# Patient Record
Sex: MALE | Race: WHITE | NOT HISPANIC OR LATINO | Employment: OTHER | URBAN - METROPOLITAN AREA
[De-identification: names, ages, dates, MRNs, and addresses within clinical notes are randomized per-mention and may not be internally consistent; named-entity substitution may affect disease eponyms.]

---

## 2017-04-12 ENCOUNTER — CARE COORDINATION (OUTPATIENT)
Dept: CARDIOLOGY | Facility: CLINIC | Age: 62
End: 2017-04-12

## 2017-04-12 DIAGNOSIS — R00.2 PALPITATIONS: Primary | ICD-10-CM

## 2017-04-12 RX ORDER — CEFAZOLIN SODIUM 1 G/50ML
3 SOLUTION INTRAVENOUS
Status: CANCELLED | OUTPATIENT
Start: 2017-04-12

## 2017-06-23 ENCOUNTER — TELEPHONE (OUTPATIENT)
Dept: FAMILY MEDICINE | Facility: CLINIC | Age: 62
End: 2017-06-23

## 2017-06-23 DIAGNOSIS — I10 BENIGN ESSENTIAL HYPERTENSION: ICD-10-CM

## 2017-06-23 RX ORDER — METOPROLOL TARTRATE 50 MG
TABLET ORAL
Qty: 60 TABLET | Refills: 0 | Status: CANCELLED | OUTPATIENT
Start: 2017-06-23

## 2017-06-23 RX ORDER — AMLODIPINE BESYLATE 5 MG/1
TABLET ORAL
Qty: 30 TABLET | Refills: 0 | Status: CANCELLED | OUTPATIENT
Start: 2017-06-23

## 2017-06-23 NOTE — TELEPHONE ENCOUNTER
"Pt requesting three month supply, states \"she told me as long as my BP is good, I don't have to come in.\"  Norvasc      Last Written Prescription Date: 5/25/2017  Last Fill Quantity: 30, # refills: 0    Last Office Visit with [facility] or [facility] prescribing provider:  5/9/2016   Future Office Visit:        BP Readings from Last 3 Encounters:   06/06/16 128/87   05/23/16 (!) 142/97   05/09/16 149/87     Lopressor       Last Written Prescription Date: 5/25/2017  Last Fill Quantity: 60,  # refills: 0   Last Office Visit with [facility], [facility] prescribing provider: 5/9/2016                                               "

## 2017-06-26 ENCOUNTER — OFFICE VISIT (OUTPATIENT)
Dept: FAMILY MEDICINE | Facility: CLINIC | Age: 62
End: 2017-06-26

## 2017-06-26 VITALS
OXYGEN SATURATION: 96 % | WEIGHT: 312 LBS | DIASTOLIC BLOOD PRESSURE: 85 MMHG | BODY MASS INDEX: 43.52 KG/M2 | SYSTOLIC BLOOD PRESSURE: 125 MMHG | HEART RATE: 73 BPM

## 2017-06-26 DIAGNOSIS — E66.01 MORBID OBESITY DUE TO EXCESS CALORIES (H): ICD-10-CM

## 2017-06-26 DIAGNOSIS — N40.1 BENIGN NON-NODULAR PROSTATIC HYPERPLASIA WITH LOWER URINARY TRACT SYMPTOMS: ICD-10-CM

## 2017-06-26 DIAGNOSIS — R73.03 PREDIABETES: Primary | ICD-10-CM

## 2017-06-26 DIAGNOSIS — Z11.59 NEED FOR HEPATITIS C SCREENING TEST: ICD-10-CM

## 2017-06-26 DIAGNOSIS — I10 BENIGN ESSENTIAL HYPERTENSION: ICD-10-CM

## 2017-06-26 LAB
ANION GAP SERPL CALCULATED.3IONS-SCNC: 4 MMOL/L (ref 3–14)
BUN SERPL-MCNC: 20 MG/DL (ref 7–30)
CALCIUM SERPL-MCNC: 9.7 MG/DL (ref 8.5–10.1)
CHLORIDE SERPL-SCNC: 104 MMOL/L (ref 94–109)
CO2 SERPL-SCNC: 30 MMOL/L (ref 20–32)
CREAT SERPL-MCNC: 0.95 MG/DL (ref 0.66–1.25)
GFR SERPL CREATININE-BSD FRML MDRD: 80 ML/MIN/1.7M2
GLUCOSE SERPL-MCNC: 120 MG/DL (ref 70–99)
HBA1C MFR BLD: 6 % (ref 4.3–6)
POTASSIUM SERPL-SCNC: 4.4 MMOL/L (ref 3.4–5.3)
SODIUM SERPL-SCNC: 138 MMOL/L (ref 133–144)

## 2017-06-26 PROCEDURE — 99214 OFFICE O/P EST MOD 30 MIN: CPT | Performed by: NURSE PRACTITIONER

## 2017-06-26 PROCEDURE — 36415 COLL VENOUS BLD VENIPUNCTURE: CPT | Performed by: NURSE PRACTITIONER

## 2017-06-26 PROCEDURE — 80048 BASIC METABOLIC PNL TOTAL CA: CPT | Performed by: NURSE PRACTITIONER

## 2017-06-26 PROCEDURE — 83036 HEMOGLOBIN GLYCOSYLATED A1C: CPT | Performed by: NURSE PRACTITIONER

## 2017-06-26 RX ORDER — AMLODIPINE BESYLATE 5 MG/1
5 TABLET ORAL DAILY
Qty: 90 TABLET | Refills: 3 | Status: SHIPPED | OUTPATIENT
Start: 2017-06-26 | End: 2018-06-18

## 2017-06-26 RX ORDER — METOPROLOL TARTRATE 50 MG
TABLET ORAL
Qty: 180 TABLET | Refills: 3 | Status: SHIPPED | OUTPATIENT
Start: 2017-06-26 | End: 2018-07-12

## 2017-06-26 RX ORDER — TAMSULOSIN HYDROCHLORIDE 0.4 MG/1
0.4 CAPSULE ORAL DAILY
Qty: 90 CAPSULE | Refills: 3 | Status: SHIPPED | OUTPATIENT
Start: 2017-06-26

## 2017-06-26 NOTE — MR AVS SNAPSHOT
After Visit Summary   6/26/2017    Umair Castellanos    MRN: [id number]           Patient Information     Date Of Birth          1955        Visit Information        Provider Department      6/26/2017 1:40 PM Lindsay Chacko APRN CNP [facility]        Today's Diagnoses     Prediabetes    -  1    Benign essential hypertension        Benign non-nodular prostatic hyperplasia with lower urinary tract symptoms        Need for hepatitis C screening test          Care Instructions    1. Labs today            Thank you for choosing [facility].  You may be receiving a survey in the mail from Antoni Gamble regarding your visit today.  Please take a few minutes to complete and return the survey to let us know how we are doing.      If you have questions or concerns, please contact us via Front[employer] or you can contact your care team at [phone].    Our Clinic hours are:  Monday 6:40 am  to 7:00 pm  Tuesday -Friday 6:40 am to 5:00 pm    The [place] outpatient lab hours are:  Monday - Friday 6:10 am to 4:45 pm  Saturdays 7:00 am to 11:00 am  Appointments are required, call [phone]    If you have clinical questions after hours or would like to schedule an appointment,  call the clinic at [phone].          Follow-ups after your visit        Who to contact     If you have questions or need follow up information about today's clinic visit or your schedule please contact [facility] directly at [phone].  Normal or non-critical lab and imaging results will be communicated to you by [employer], letter or phone within 4 business days after the clinic has received the results. If you do not hear from us within 7 days, please contact the clinic through [employer] or phone. If you have a critical or abnormal lab result, we will notify you by phone as soon as possible.  Submit refill requests through [employer] or call your pharmacy and they will forward the refill request to us.  "Please allow 3 business days for your refill to be completed.          Additional Information About Your Visit        MyChart Information     [employer] lets you send messages to your doctor, view your test results, renew your prescriptions, schedule appointments and more. To sign up, go to [email or web address] . Click on \"Log in\" on the left side of the screen, which will take you to the Welcome page. Then click on \"Sign up Now\" on the right side of the page.     You will be asked to enter the access code listed below, as well as some personal information. Please follow the directions to create your username and password.     Your access code is: [email or web address]  Expires: 2017  2:05 PM     Your access code will  in 90 days. If you need help or a new code, please call your [place] clinic or [phone].        Care EveryWhere ID     This is your Care EveryWhere ID. This could be used by other organizations to access your [place] medical records  [id number]        Your Vitals Were     Pulse Pulse Oximetry BMI (Body Mass Index)             73 96% 43.52 kg/m2          Blood Pressure from Last 3 Encounters:   17 125/85   16 128/87   16 (!) 142/97    Weight from Last 3 Encounters:   17 (!) 312 lb (141.5 kg)   16 (!) 314 lb (142.4 kg)   14 292 lb (132.5 kg)              We Performed the Following     Basic metabolic panel     Hemoglobin A1c          Today's Medication Changes          These changes are accurate as of: 17  2:05 PM.  If you have any questions, ask your nurse or doctor.               These medicines have changed or have updated prescriptions.        Dose/Directions    amLODIPine 5 MG tablet   Commonly known as:  NORVASC   This may have changed:  See the new instructions.   Used for:  Benign essential hypertension        Dose:  5 mg   Take 1 tablet (5 mg) by mouth daily   Quantity:  90 tablet   Refills:  3       metoprolol 50 MG tablet   Commonly known as: "  LOPRESSOR   This may have changed:  See the new instructions.   Used for:  Benign essential hypertension        TAKE 1 TABLET (50 MG) BY MOUTH 2 TIMES DAILY   Quantity:  180 tablet   Refills:  3            Where to get your medicines      These medications were sent to [place] [address] [facility] - [address], [facility] [address]     Phone:  [phone]     amLODIPine 5 MG tablet    metoprolol 50 MG tablet    tamsulosin 0.4 MG capsule                Primary Care Provider Office Phone # Fax #    QASIM Shaver [facility] [phone]       [facility] [address] [facility] [address]        Equal Access to Services     RICARDA DAVID : Hadii aad ku hadasho Soomaali, waaxda luqadaha, qaybta kaalmada adeegyada, aleta norton. So [facility] [phone].    ATENCIÓN: Si habla español, tiene a cabrera disposición servicios gratuitos de asistencia lingüística. Llame al [phone].    We comply with applicable federal civil rights laws and [place] laws. We do not discriminate on the basis of race, color, national origin, age, disability sex, sexual orientation or gender identity.            Thank you!     Thank you for choosing [facility]  for your care. Our goal is always to provide you with excellent care. Hearing back from our patients is one way we can continue to improve our services. Please take a few minutes to complete the written survey that you may receive in the mail after your visit with us. Thank you!             Your Updated Medication List - Protect others around you: Learn how to safely use, store and throw away your medicines at www.disposemymeds.org.          This list is accurate as of: 6/26/17  2:05 PM.  Always use your most recent med list.                   Brand Name Dispense Instructions for use Diagnosis    amLODIPine 5 MG tablet    NORVASC    90 tablet    Take 1 tablet (5 mg) by mouth daily    Benign  essential hypertension       aspirin 81 MG tablet      Take 1 tablet by mouth daily.        metoprolol 50 MG tablet    LOPRESSOR    180 tablet    TAKE 1 TABLET (50 MG) BY MOUTH 2 TIMES DAILY    Benign essential hypertension       tamsulosin 0.4 MG capsule    FLOMAX    90 capsule    Take 1 capsule (0.4 mg) by mouth daily    Benign non-nodular prostatic hyperplasia with lower urinary tract symptoms

## 2017-06-26 NOTE — PATIENT INSTRUCTIONS
1. Labs today            Thank you for choosing [facility].  You may be receiving a survey in the mail from Antoni Gamble regarding your visit today.  Please take a few minutes to complete and return the survey to let us know how we are doing.      If you have questions or concerns, please contact us via [employer] or you can contact your care team at [phone].    Our Clinic hours are:  Monday 6:40 am  to 7:00 pm  Tuesday -Friday 6:40 am to 5:00 pm    The [place] outpatient lab hours are:  Monday - Friday 6:10 am to 4:45 pm  Saturdays 7:00 am to 11:00 am  Appointments are required, call [phone]    If you have clinical questions after hours or would like to schedule an appointment,  call the clinic at [phone].

## 2017-06-26 NOTE — PROGRESS NOTES
Please let the patient know that all labs are normal.  Glucose slightly high- patient may not have been fasting

## 2017-06-26 NOTE — NURSING NOTE
"Initial /85 (BP Location: Left arm, Patient Position: Left side, Cuff Size: Adult Large)  Pulse 73  Wt (!) 312 lb (141.5 kg)  SpO2 96%  BMI 43.52 kg/m2 Estimated body mass index is 43.52 kg/(m^2) as calculated from the following:    Height as of 5/9/16: 5' 11\" (1.803 m).    Weight as of this encounter: 312 lb (141.5 kg). .    Fide Hawkins    "

## 2017-06-26 NOTE — PROGRESS NOTES
"  SUBJECTIVE:                                                    Umair Castellanos is a 62 year old male who presents to clinic today for the following health issues:      Hypertension Follow-up      Outpatient blood pressures are being checked at home.  Results are \"normal\".    Low Salt Diet: low salt      Amount of exercise or physical activity: None    Problems taking medications regularly: No    Medication side effects: none    Diet: regular (no restrictions)  BP Readings from Last 6 Encounters:   06/26/17 125/85   06/06/16 128/87   05/23/16 (!) 142/97   05/09/16 149/87   12/05/14 (!) 168/102   06/02/14 131/75       Prediabetes: active being retired otherwise does not work out . Has not lost weight. Does not monitor his diet.     Morbid obesity:  being retired otherwise does not work out . Has not lost weight. Does not monitor his diet.       Medication Followup of Flomax    Taking Medication as prescribed: yes    Side Effects:  None    Medication Helping Symptoms:  yes       Problem list and histories reviewed & adjusted, as indicated.  Additional history: as documented    Patient Active Problem List   Diagnosis     Benign essential hypertension     Hyperlipidemia LDL goal <100     Obesity     Syncope     Colon polyp     Ascending aorta dilatation (H)     Sinusitis, chronic     Deviated nasal septum     Past Surgical History:   Procedure Laterality Date     ARTHROPLASTY KNEE       COLONOSCOPY  7/28/2011    Procedure:COLONOSCOPY; Surgeon:VERONIQUE CHIN; Location:[place] [facility]     COLONOSCOPY,LULU HINOJOSA/CAUTERY       LARYNGOSCOPY, EXCISE VOCAL CORD LESION COMPLEX, COMBINED         Social History   Substance Use Topics     Smoking status: Never Smoker     Smokeless tobacco: Never Used     Alcohol use 0.5 oz/week     1 Cans of beer per week      Comment: ONE OR TWO WEEKLY     Family History   Problem Relation Age of Onset     DIABETES Father      Hearing Loss Father      High cholesterol Father      Eye Disorder " Father      Hypertension Father      Arthritis Sister      Neurologic Disorder Sister      migraines     HEART DISEASE Brother 64     AORTIC VALVE REPLACEMENT     HEART DISEASE Maternal Grandmother      HEART DISEASE Maternal Grandfather      HEART DISEASE Paternal Grandmother      KIDNEY DISEASE Paternal Grandmother      Hypertension Mother      CEREBROVASCULAR DISEASE Mother      CANCER Maternal Uncle      HEART DISEASE Paternal Aunt            Reviewed and updated as needed this visit by clinical staff       Reviewed and updated as needed this visit by Provider         ROS:  Constitutional, HEENT, cardiovascular, pulmonary, GI, , musculoskeletal, neuro, skin, endocrine and psych systems are negative, except as otherwise noted.    OBJECTIVE:     /85 (BP Location: Left arm, Patient Position: Left side, Cuff Size: Adult Large)  Pulse 73  Wt (!) 312 lb (141.5 kg)  SpO2 96%  BMI 43.52 kg/m2  Body mass index is 43.52 kg/(m^2).  GENERAL: alert, no distress and obese  RESP: lungs clear to auscultation - no rales, rhonchi or wheezes  CV: regular rate and rhythm, normal S1 S2, no S3 or S4, no murmur, click or rub, no peripheral edema and peripheral pulses strong  MS: no gross musculoskeletal defects noted, no edema    Diagnostic Test Results:  none     ASSESSMENT/PLAN:         1. Benign essential hypertension  controlled  - amLODIPine (NORVASC) 5 MG tablet; Take 1 tablet (5 mg) by mouth daily  Dispense: 90 tablet; Refill: 3  - metoprolol (LOPRESSOR) 50 MG tablet; TAKE 1 TABLET (50 MG) BY MOUTH 2 TIMES DAILY  Dispense: 180 tablet; Refill: 3    2. Benign non-nodular prostatic hyperplasia with lower urinary tract symptoms  stable  - tamsulosin (FLOMAX) 0.4 MG capsule; Take 1 capsule (0.4 mg) by mouth daily  Dispense: 90 capsule; Refill: 3  - Basic metabolic panel    3. Prediabetes  Discussed importance of weight loss, healthy eating and start exercising   - Hemoglobin A1c    4. Need for hepatitis C screening  test  Patient declined at this time as he does not have insurance benefits     5. Morbid obesity due to excess calories (H)  Encouraged to start exercise and healthier eating plan.           QASIM Shaver [facility]

## 2017-06-26 NOTE — TELEPHONE ENCOUNTER
Left message for patient to return call to clinic.  Needs appointment per last refill note from 5/19/17 from provider.  It has been over a year since last OV.    Arleen Varela RN

## 2018-06-18 DIAGNOSIS — I10 BENIGN ESSENTIAL HYPERTENSION: ICD-10-CM

## 2018-06-18 NOTE — TELEPHONE ENCOUNTER
"Requested Prescriptions   Pending Prescriptions Disp Refills     amLODIPine (NORVASC) 5 MG tablet [Pharmacy Med Name: AMLODIPINE BESYLATE 5 MG TAB]  Last Written Prescription Date:  06/26/17  Last Fill Quantity: 90,  # refills: 3   Last office visit: 6/26/2017 with prescribing provider:  06/26/17   Future Office Visit:     90 tablet 3     Sig: TAKE 1 TABLET (5 MG) BY MOUTH DAILY    Calcium Channel Blockers Protocol  Passed    6/18/2018  1:47 AM       Passed - Blood pressure under 140/90 in past 12 months    BP Readings from Last 3 Encounters:   06/26/17 125/85   06/06/16 128/87   05/23/16 (!) 142/97          Passed - Recent (12 mo) or future (30 days) visit within the authorizing provider's specialty    Patient had office visit in the last 12 months or has a visit in the next 30 days with authorizing provider or within the authorizing provider's specialty.  See \"Patient Info\" tab in inbasket, or \"Choose Columns\" in Meds & Orders section of the refill encounter.           Passed - Patient is age 18 or older       Passed - Normal serum creatinine on file in past 12 months    Recent Labs   Lab Test  06/26/17   1409   CR  0.95               "

## 2018-06-20 RX ORDER — AMLODIPINE BESYLATE 5 MG/1
5 TABLET ORAL DAILY
Qty: 30 TABLET | Refills: 0 | Status: SHIPPED | OUTPATIENT
Start: 2018-06-20

## 2018-07-12 DIAGNOSIS — I10 BENIGN ESSENTIAL HYPERTENSION: ICD-10-CM

## 2018-07-12 NOTE — TELEPHONE ENCOUNTER
"Requested Prescriptions   Pending Prescriptions Disp Refills     metoprolol tartrate (LOPRESSOR) 50 MG tablet [Pharmacy Med Name: METOPROLOL TARTRATE 50 MG TAB]  Last Written Prescription Date:  06/26/17  Last Fill Quantity: 180,  # refills: 3   Last office visit: 6/26/2017 with prescribing provider:  06/26/17   Future Office Visit:     180 tablet 1     Sig: TAKE 1 TABLET BY MOUTH TWICE DAILY    Beta-Blockers Protocol Failed    7/12/2018  9:25 AM       Failed - Blood pressure under 140/90 in past 12 months    BP Readings from Last 3 Encounters:   06/26/17 125/85   06/06/16 128/87   05/23/16 (!) 142/97          Failed - Recent (12 mo) or future (30 days) visit within the authorizing provider's specialty    Patient had office visit in the last 12 months or has a visit in the next 30 days with authorizing provider or within the authorizing provider's specialty.  See \"Patient Info\" tab in inbasket, or \"Choose Columns\" in Meds & Orders section of the refill encounter.           Passed - Patient is age 6 or older          "

## 2018-07-13 RX ORDER — METOPROLOL TARTRATE 50 MG
50 TABLET ORAL 2 TIMES DAILY
Qty: 60 TABLET | Refills: 0 | Status: SHIPPED | OUTPATIENT
Start: 2018-07-13

## 2018-07-22 DIAGNOSIS — I10 BENIGN ESSENTIAL HYPERTENSION: ICD-10-CM

## 2018-07-23 NOTE — TELEPHONE ENCOUNTER
"Requested Prescriptions   Pending Prescriptions Disp Refills     amLODIPine (NORVASC) 5 MG tablet [Pharmacy Med Name: AMLODIPINE BESYLATE 5 MG TAB]  Last Written Prescription Date:  06/2/18  Last Fill Quantity: 30,  # refills: 0   Last office visit: 6/26/2017 with prescribing provider:  06/26/17   Future Office Visit:     30 tablet 0     Sig: TAKE 1 TABLET (5 MG) BY MOUTH DAILY (NEEDS FOLLOW-UP APPOINTMENT FOR THIS MEDICATION)    Calcium Channel Blockers Protocol  Failed    7/22/2018  1:52 AM       Failed - Blood pressure under 140/90 in past 12 months    BP Readings from Last 3 Encounters:   06/26/17 125/85   06/06/16 128/87   05/23/16 (!) 142/97          Failed - Recent (12 mo) or future (30 days) visit within the authorizing provider's specialty    Patient had office visit in the last 12 months or has a visit in the next 30 days with authorizing provider or within the authorizing provider's specialty.  See \"Patient Info\" tab in inbasket, or \"Choose Columns\" in Meds & Orders section of the refill encounter.         Failed - Normal serum creatinine on file in past 12 months    Recent Labs   Lab Test  06/26/17   1409   CR  0.95          Passed - Patient is age 18 or older          "

## 2018-07-24 NOTE — TELEPHONE ENCOUNTER
Left message on answering machine for patient to call back.  Alba was given 6/20/18 and no appt scheduled.     Patsy VEGA

## 2018-07-25 RX ORDER — AMLODIPINE BESYLATE 5 MG/1
TABLET ORAL
Qty: 30 TABLET | Refills: 0 | OUTPATIENT
Start: 2018-07-25

## 2018-08-15 DIAGNOSIS — I10 BENIGN ESSENTIAL HYPERTENSION: ICD-10-CM

## 2018-08-15 RX ORDER — METOPROLOL TARTRATE 50 MG
TABLET ORAL
Qty: 60 TABLET | Refills: 0 | OUTPATIENT
Start: 2018-08-15

## 2018-08-15 NOTE — TELEPHONE ENCOUNTER
"Requested Prescriptions   Pending Prescriptions Disp Refills     metoprolol tartrate (LOPRESSOR) 50 MG tablet [Pharmacy Med Name: METOPROLOL TARTRATE 50 MG TAB]  Last Written Prescription Date:  07/13/18  Last Fill Quantity: 60,  # refills: 0   Last office visit: 6/26/2017 with prescribing provider:  06/26/17   Future Office Visit:     60 tablet 0     Sig: TAKE 1 TABLET (50 MG) BY MOUTH 2 TIMES DAILY (NEEDS FOLLOW-UP APPOINTMENT FOR THIS MEDICATION)    Beta-Blockers Protocol Failed    8/15/2018  1:59 AM       Failed - Blood pressure under 140/90 in past 12 months    BP Readings from Last 3 Encounters:   06/26/17 125/85   06/06/16 128/87   05/23/16 (!) 142/97          Failed - Recent (12 mo) or future (30 days) visit within the authorizing provider's specialty    Patient had office visit in the last 12 months or has a visit in the next 30 days with authorizing provider or within the authorizing provider's specialty.  See \"Patient Info\" tab in inbasket, or \"Choose Columns\" in Meds & Orders section of the refill encounter.           Passed - Patient is age 6 or older          "

## 2024-07-26 ENCOUNTER — TRANSFERRED RECORDS (OUTPATIENT)
Dept: HEALTH INFORMATION MANAGEMENT | Facility: CLINIC | Age: 69
End: 2024-07-26

## 2025-05-20 ENCOUNTER — TRANSFERRED RECORDS (OUTPATIENT)
Dept: HEALTH INFORMATION MANAGEMENT | Facility: CLINIC | Age: 70
End: 2025-05-20
Payer: MEDICARE

## 2025-05-27 ENCOUNTER — TELEPHONE (OUTPATIENT)
Dept: OPHTHALMOLOGY | Facility: CLINIC | Age: 70
End: 2025-05-27
Payer: MEDICARE

## 2025-05-27 NOTE — TELEPHONE ENCOUNTER
[facility] Call Center    Phone Message    May a detailed message be left on voicemail: yes     Reason for Call: Other: Patient's spouse, Antoinette, returned call, stating that is would be easier to call her phone, [phone], as the patient's phone is difficult to get into in time to answer or access messages. Thank you.      Action Taken: Other: [facility]    Travel Screening: Not Applicable     Date of Service:

## 2025-05-27 NOTE — TELEPHONE ENCOUNTER
Patient confirmed scheduled appointment:  Date: 6/2/25  Time: 10:30AM  Visit type: NEW PLASTICS EYE  Provider:   Location: [facility] Location  Testing/imaging: NONE  Additional notes:  for: New patient for : Orbital tumor. -Per Dr. Wang.-Appt Per PT     Provided appointment details over the phone.

## 2025-05-27 NOTE — TELEPHONE ENCOUNTER
Patient confirmed scheduled appointment:  Date: 6/2/25  Time: 10:30AM  Visit type: NEW PLASTICS EYE  Provider:   Location: [facility] Location  Testing/imaging: NONE  Additional notes:  for: New patient for : Orbital tumor. -Per Dr. Wang.-Appt Per PT      Provided appointment details over the phone.

## 2025-05-27 NOTE — TELEPHONE ENCOUNTER
Spoke with patient's spouse regarding scheduling for 5/30/25 with  for: New patient for : Orbital tumor. -Per Dr. Wang.  Patient already has 2 other appointments scheduled for that date and unable to make appointment as offered work.    Informed spouse of a pending call back once we can review other options.-Per Patient's spouse.

## 2025-05-28 NOTE — TELEPHONE ENCOUNTER
FUTURE VISIT INFORMATION:  Appointment Date: 6/2/25  Appointment Time: 10:15am     REFERRAL INFORMATION:  Referring Provider:  Dr. Calos Wang   Referring Clinic:  [facility]  Reason for Visit/Diagnosis:  for: New patient for : Orbital tumor. -Appt Per PT (ok [place])      NOTES STATUS DETAILS   OFFICE NOTE from Referring Provider Internal and CE 5/16/25 - OV Ophth    5/20/25 - OP Radiology   OFFICE NOTE from Eye Clinics  * Include Visual Field Tests Care Everywhere [facility]:   5/19/25 - OV FP-IM-PED w/ Ho Martínez PA-C     1/13/25 - OP Radiology w/ Nathan Thorpe    OFFICE NOTE from Other Specialists  * Neurology, Dermatology, ENT N/A    HOSPITAL DISCHARGE SUMMARY/  ED VISITS N/A    PATHOLOGY REPORTS (RELATED) N/A    OPERATIVE REPORT N/A    Head/Brain/Orbits Imaging     CT Internal [facility]:  4/10/13 - CT HEAD   12/8/10 - CT HEAD   MRI Internal and CE [facility]:  12/9/10 - MRI BRAIN  12/9/10 - MRI ANGIOGRAM HEAD    [facility]:  5/20/25 - MR BRAIN/ORBIT*  1/13/25 - MR BRAIN*  8/5/25 - MRI BRAIN*

## 2025-06-02 ENCOUNTER — OFFICE VISIT (OUTPATIENT)
Dept: OPHTHALMOLOGY | Facility: CLINIC | Age: 70
End: 2025-06-02
Payer: MEDICARE

## 2025-06-02 ENCOUNTER — PRE VISIT (OUTPATIENT)
Dept: OPHTHALMOLOGY | Facility: CLINIC | Age: 70
End: 2025-06-02

## 2025-06-02 ENCOUNTER — ANESTHESIA EVENT (OUTPATIENT)
Dept: SURGERY | Facility: CLINIC | Age: 70
End: 2025-06-02
Payer: MEDICARE

## 2025-06-02 ENCOUNTER — ANESTHESIA (OUTPATIENT)
Dept: SURGERY | Facility: CLINIC | Age: 70
End: 2025-06-02
Payer: MEDICARE

## 2025-06-02 ENCOUNTER — HOSPITAL ENCOUNTER (OUTPATIENT)
Facility: CLINIC | Age: 70
Setting detail: OBSERVATION
LOS: 1 days | Discharge: HOME | End: 2025-06-03
Attending: OPHTHALMOLOGY | Admitting: INTERNAL MEDICINE
Payer: MEDICARE

## 2025-06-02 VITALS — BODY MASS INDEX: 40.8 KG/M2 | WEIGHT: 285 LBS | HEIGHT: 70 IN

## 2025-06-02 DIAGNOSIS — Z98.890 S/P EYE SURGERY: ICD-10-CM

## 2025-06-02 DIAGNOSIS — R11.0 NAUSEA: ICD-10-CM

## 2025-06-02 DIAGNOSIS — D72.820 LYMPHOCYTOSIS: ICD-10-CM

## 2025-06-02 DIAGNOSIS — H54.7 VISION LOSS: Primary | ICD-10-CM

## 2025-06-02 DIAGNOSIS — H40.051 OCULAR HYPERTENSION OF RIGHT EYE: Primary | ICD-10-CM

## 2025-06-02 DIAGNOSIS — H05.89 MASS OF ORBIT: ICD-10-CM

## 2025-06-02 DIAGNOSIS — I10 BENIGN ESSENTIAL HYPERTENSION: ICD-10-CM

## 2025-06-02 PROCEDURE — 250N000013 HC RX MED GY IP 250 OP 250 PS 637

## 2025-06-02 PROCEDURE — 88341 IMHCHEM/IMCYTCHM EA ADD ANTB: CPT | Mod: 26 | Performed by: STUDENT IN AN ORGANIZED HEALTH CARE EDUCATION/TRAINING PROGRAM

## 2025-06-02 PROCEDURE — 250N000009 HC RX 250: Performed by: NURSE ANESTHETIST, CERTIFIED REGISTERED

## 2025-06-02 PROCEDURE — 88185 FLOWCYTOMETRY/TC ADD-ON: CPT | Performed by: OPHTHALMOLOGY

## 2025-06-02 PROCEDURE — 88264 CHROMOSOME ANALYSIS 20-25: CPT | Performed by: OPHTHALMOLOGY

## 2025-06-02 PROCEDURE — 250N000009 HC RX 250: Performed by: OPHTHALMOLOGY

## 2025-06-02 PROCEDURE — 710N000010 HC RECOVERY PHASE 1, LEVEL 2, PER MIN: Performed by: OPHTHALMOLOGY

## 2025-06-02 PROCEDURE — 88342 IMHCHEM/IMCYTCHM 1ST ANTB: CPT | Mod: 26 | Performed by: STUDENT IN AN ORGANIZED HEALTH CARE EDUCATION/TRAINING PROGRAM

## 2025-06-02 PROCEDURE — 250N000011 HC RX IP 250 OP 636: Performed by: NURSE ANESTHETIST, CERTIFIED REGISTERED

## 2025-06-02 PROCEDURE — 99204 OFFICE O/P NEW MOD 45 MIN: CPT | Mod: 57 | Performed by: OPHTHALMOLOGY

## 2025-06-02 PROCEDURE — 88360 TUMOR IMMUNOHISTOCHEM/MANUAL: CPT | Mod: 26 | Performed by: STUDENT IN AN ORGANIZED HEALTH CARE EDUCATION/TRAINING PROGRAM

## 2025-06-02 PROCEDURE — 250N000011 HC RX IP 250 OP 636: Mod: JZ | Performed by: STUDENT IN AN ORGANIZED HEALTH CARE EDUCATION/TRAINING PROGRAM

## 2025-06-02 PROCEDURE — 92285 EXTERNAL OCULAR PHOTOGRAPHY: CPT | Mod: GC | Performed by: OPHTHALMOLOGY

## 2025-06-02 PROCEDURE — 99207 PR APP CREDIT; MD BILLING SHARED VISIT: CPT | Performed by: PHYSICIAN ASSISTANT

## 2025-06-02 PROCEDURE — 88271 CYTOGENETICS DNA PROBE: CPT | Performed by: OPHTHALMOLOGY

## 2025-06-02 PROCEDURE — 250N000009 HC RX 250: Performed by: PHYSICIAN ASSISTANT

## 2025-06-02 PROCEDURE — 88364 INSITU HYBRIDIZATION (FISH): CPT | Mod: 26 | Performed by: STUDENT IN AN ORGANIZED HEALTH CARE EDUCATION/TRAINING PROGRAM

## 2025-06-02 PROCEDURE — 88271 CYTOGENETICS DNA PROBE: CPT | Performed by: OTOLARYNGOLOGY

## 2025-06-02 PROCEDURE — 88280 CHROMOSOME KARYOTYPE STUDY: CPT | Performed by: OPHTHALMOLOGY

## 2025-06-02 PROCEDURE — 370N000017 HC ANESTHESIA TECHNICAL FEE, PER MIN: Performed by: OPHTHALMOLOGY

## 2025-06-02 PROCEDURE — 258N000003 HC RX IP 258 OP 636: Performed by: NURSE ANESTHETIST, CERTIFIED REGISTERED

## 2025-06-02 PROCEDURE — G0378 HOSPITAL OBSERVATION PER HR: HCPCS

## 2025-06-02 PROCEDURE — 250N000011 HC RX IP 250 OP 636: Mod: JZ

## 2025-06-02 PROCEDURE — 250N000011 HC RX IP 250 OP 636: Performed by: OPHTHALMOLOGY

## 2025-06-02 PROCEDURE — 250N000009 HC RX 250: Performed by: STUDENT IN AN ORGANIZED HEALTH CARE EDUCATION/TRAINING PROGRAM

## 2025-06-02 PROCEDURE — 88341 IMHCHEM/IMCYTCHM EA ADD ANTB: CPT | Mod: TC | Performed by: OPHTHALMOLOGY

## 2025-06-02 PROCEDURE — 88184 FLOWCYTOMETRY/ TC 1 MARKER: CPT | Performed by: OPHTHALMOLOGY

## 2025-06-02 PROCEDURE — 360N000076 HC SURGERY LEVEL 3, PER MIN: Performed by: OPHTHALMOLOGY

## 2025-06-02 PROCEDURE — 88365 INSITU HYBRIDIZATION (FISH): CPT | Mod: 26 | Performed by: STUDENT IN AN ORGANIZED HEALTH CARE EDUCATION/TRAINING PROGRAM

## 2025-06-02 PROCEDURE — 999N000141 HC STATISTIC PRE-PROCEDURE NURSING ASSESSMENT: Performed by: OPHTHALMOLOGY

## 2025-06-02 PROCEDURE — 88305 TISSUE EXAM BY PATHOLOGIST: CPT | Mod: 26 | Performed by: STUDENT IN AN ORGANIZED HEALTH CARE EDUCATION/TRAINING PROGRAM

## 2025-06-02 PROCEDURE — 88291 CYTO/MOLECULAR REPORT: CPT | Performed by: MEDICAL GENETICS

## 2025-06-02 PROCEDURE — 250N000025 HC SEVOFLURANE, PER MIN: Performed by: OPHTHALMOLOGY

## 2025-06-02 PROCEDURE — 272N000001 HC OR GENERAL SUPPLY STERILE: Performed by: OPHTHALMOLOGY

## 2025-06-02 PROCEDURE — 88189 FLOWCYTOMETRY/READ 16 & >: CPT | Performed by: PATHOLOGY

## 2025-06-02 RX ORDER — FENTANYL CITRATE 50 UG/ML
25 INJECTION, SOLUTION INTRAMUSCULAR; INTRAVENOUS EVERY 5 MIN PRN
Refills: 0 | Status: DISCONTINUED | OUTPATIENT
Start: 2025-06-02 | End: 2025-06-02 | Stop reason: HOSPADM

## 2025-06-02 RX ORDER — FENTANYL CITRATE 50 UG/ML
INJECTION, SOLUTION INTRAMUSCULAR; INTRAVENOUS PRN
Status: DISCONTINUED | OUTPATIENT
Start: 2025-06-02 | End: 2025-06-03

## 2025-06-02 RX ORDER — LIDOCAINE HYDROCHLORIDE AND EPINEPHRINE 10; 10 MG/ML; UG/ML
INJECTION, SOLUTION INFILTRATION; PERINEURAL PRN
Status: DISCONTINUED | OUTPATIENT
Start: 2025-06-02 | End: 2025-06-02 | Stop reason: HOSPADM

## 2025-06-02 RX ORDER — NALOXONE HYDROCHLORIDE 0.4 MG/ML
0.4 INJECTION, SOLUTION INTRAMUSCULAR; INTRAVENOUS; SUBCUTANEOUS
Status: DISCONTINUED | OUTPATIENT
Start: 2025-06-02 | End: 2025-06-03 | Stop reason: HOSPADM

## 2025-06-02 RX ORDER — LIDOCAINE HYDROCHLORIDE 20 MG/ML
INJECTION, SOLUTION INFILTRATION; PERINEURAL PRN
Status: DISCONTINUED | OUTPATIENT
Start: 2025-06-02 | End: 2025-06-03

## 2025-06-02 RX ORDER — PANTOPRAZOLE SODIUM 40 MG/1
40 TABLET, DELAYED RELEASE ORAL
Status: DISCONTINUED | OUTPATIENT
Start: 2025-06-03 | End: 2025-06-03 | Stop reason: HOSPADM

## 2025-06-02 RX ORDER — NALOXONE HYDROCHLORIDE 0.4 MG/ML
0.2 INJECTION, SOLUTION INTRAMUSCULAR; INTRAVENOUS; SUBCUTANEOUS
Status: DISCONTINUED | OUTPATIENT
Start: 2025-06-02 | End: 2025-06-03 | Stop reason: HOSPADM

## 2025-06-02 RX ORDER — PROCHLORPERAZINE MALEATE 5 MG/1
5 TABLET ORAL EVERY 6 HOURS PRN
Status: DISCONTINUED | OUTPATIENT
Start: 2025-06-02 | End: 2025-06-03 | Stop reason: HOSPADM

## 2025-06-02 RX ORDER — NEOMYCIN SULFATE, POLYMYXIN B SULFATE, AND DEXAMETHASONE 3.5; 10000; 1 MG/G; [USP'U]/G; MG/G
0.5 OINTMENT OPHTHALMIC 4 TIMES DAILY
Qty: 3.5 G | Refills: 0 | Status: SHIPPED | OUTPATIENT
Start: 2025-06-02 | End: 2025-06-03

## 2025-06-02 RX ORDER — ROSUVASTATIN CALCIUM 5 MG/1
10 TABLET, COATED ORAL DAILY
Status: DISCONTINUED | OUTPATIENT
Start: 2025-06-03 | End: 2025-06-03 | Stop reason: HOSPADM

## 2025-06-02 RX ORDER — METOPROLOL TARTRATE 50 MG
50 TABLET ORAL 2 TIMES DAILY
Status: DISCONTINUED | OUTPATIENT
Start: 2025-06-02 | End: 2025-06-03 | Stop reason: HOSPADM

## 2025-06-02 RX ORDER — AMOXICILLIN 250 MG
2 CAPSULE ORAL 2 TIMES DAILY PRN
Status: DISCONTINUED | OUTPATIENT
Start: 2025-06-02 | End: 2025-06-03 | Stop reason: HOSPADM

## 2025-06-02 RX ORDER — ONDANSETRON 2 MG/ML
4 INJECTION INTRAMUSCULAR; INTRAVENOUS EVERY 30 MIN PRN
Status: DISCONTINUED | OUTPATIENT
Start: 2025-06-02 | End: 2025-06-02 | Stop reason: HOSPADM

## 2025-06-02 RX ORDER — PREDNISONE 20 MG/1
80 TABLET ORAL DAILY
Qty: 40 TABLET | Refills: 0 | Status: CANCELLED | OUTPATIENT
Start: 2025-06-02

## 2025-06-02 RX ORDER — NEOMYCIN SULFATE, POLYMYXIN B SULFATE, AND DEXAMETHASONE 3.5; 10000; 1 MG/G; [USP'U]/G; MG/G
OINTMENT OPHTHALMIC EVERY 8 HOURS SCHEDULED
Status: DISCONTINUED | OUTPATIENT
Start: 2025-06-02 | End: 2025-06-03 | Stop reason: HOSPADM

## 2025-06-02 RX ORDER — FENTANYL CITRATE 50 UG/ML
50 INJECTION, SOLUTION INTRAMUSCULAR; INTRAVENOUS EVERY 5 MIN PRN
Refills: 0 | Status: DISCONTINUED | OUTPATIENT
Start: 2025-06-02 | End: 2025-06-02 | Stop reason: HOSPADM

## 2025-06-02 RX ORDER — DEXAMETHASONE SODIUM PHOSPHATE 4 MG/ML
4 INJECTION, SOLUTION INTRA-ARTICULAR; INTRALESIONAL; INTRAMUSCULAR; INTRAVENOUS; SOFT TISSUE
Status: DISCONTINUED | OUTPATIENT
Start: 2025-06-02 | End: 2025-06-02 | Stop reason: HOSPADM

## 2025-06-02 RX ORDER — ACETAMINOPHEN 325 MG/1
975 TABLET ORAL ONCE
Status: COMPLETED | OUTPATIENT
Start: 2025-06-02 | End: 2025-06-02

## 2025-06-02 RX ORDER — METHOTREXATE 2.5 MG/1
TABLET ORAL
COMMUNITY
Start: 2025-01-29

## 2025-06-02 RX ORDER — SODIUM CHLORIDE, SODIUM LACTATE, POTASSIUM CHLORIDE, CALCIUM CHLORIDE 600; 310; 30; 20 MG/100ML; MG/100ML; MG/100ML; MG/100ML
INJECTION, SOLUTION INTRAVENOUS CONTINUOUS PRN
Status: DISCONTINUED | OUTPATIENT
Start: 2025-06-02 | End: 2025-06-03

## 2025-06-02 RX ORDER — ONDANSETRON 4 MG/1
4 TABLET, ORALLY DISINTEGRATING ORAL EVERY 6 HOURS PRN
Status: DISCONTINUED | OUTPATIENT
Start: 2025-06-02 | End: 2025-06-03 | Stop reason: HOSPADM

## 2025-06-02 RX ORDER — AMLODIPINE BESYLATE 5 MG/1
5 TABLET ORAL DAILY
Status: DISCONTINUED | OUTPATIENT
Start: 2025-06-03 | End: 2025-06-03 | Stop reason: HOSPADM

## 2025-06-02 RX ORDER — CEFAZOLIN SODIUM/WATER 3 G/30 ML
SYRINGE (ML) INTRAVENOUS PRN
Status: DISCONTINUED | OUTPATIENT
Start: 2025-06-02 | End: 2025-06-03

## 2025-06-02 RX ORDER — SODIUM CHLORIDE, SODIUM LACTATE, POTASSIUM CHLORIDE, CALCIUM CHLORIDE 600; 310; 30; 20 MG/100ML; MG/100ML; MG/100ML; MG/100ML
INJECTION, SOLUTION INTRAVENOUS CONTINUOUS
Status: DISCONTINUED | OUTPATIENT
Start: 2025-06-02 | End: 2025-06-02 | Stop reason: HOSPADM

## 2025-06-02 RX ORDER — AMOXICILLIN 250 MG
1 CAPSULE ORAL 2 TIMES DAILY PRN
Status: DISCONTINUED | OUTPATIENT
Start: 2025-06-02 | End: 2025-06-03 | Stop reason: HOSPADM

## 2025-06-02 RX ORDER — HYDROXYZINE HYDROCHLORIDE 10 MG/1
10 TABLET, FILM COATED ORAL EVERY 6 HOURS PRN
Status: DISCONTINUED | OUTPATIENT
Start: 2025-06-02 | End: 2025-06-02 | Stop reason: HOSPADM

## 2025-06-02 RX ORDER — ONDANSETRON 2 MG/ML
INJECTION INTRAMUSCULAR; INTRAVENOUS PRN
Status: DISCONTINUED | OUTPATIENT
Start: 2025-06-02 | End: 2025-06-03

## 2025-06-02 RX ORDER — METHOTREXATE 2.5 MG/1
15 TABLET ORAL
Status: DISCONTINUED | OUTPATIENT
Start: 2025-06-08 | End: 2025-06-03 | Stop reason: HOSPADM

## 2025-06-02 RX ORDER — TRIAMCINOLONE ACETONIDE 40 MG/ML
INJECTION, SUSPENSION INTRA-ARTICULAR; INTRAMUSCULAR PRN
Status: DISCONTINUED | OUTPATIENT
Start: 2025-06-02 | End: 2025-06-02 | Stop reason: HOSPADM

## 2025-06-02 RX ORDER — ONDANSETRON 4 MG/1
4 TABLET, ORALLY DISINTEGRATING ORAL EVERY 30 MIN PRN
Status: DISCONTINUED | OUTPATIENT
Start: 2025-06-02 | End: 2025-06-02 | Stop reason: HOSPADM

## 2025-06-02 RX ORDER — TRANEXAMIC ACID 10 MG/ML
1 INJECTION, SOLUTION INTRAVENOUS ONCE
Status: COMPLETED | OUTPATIENT
Start: 2025-06-02 | End: 2025-06-02

## 2025-06-02 RX ORDER — NEOMYCIN SULFATE, POLYMYXIN B SULFATE, AND DEXAMETHASONE 3.5; 10000; 1 MG/G; [USP'U]/G; MG/G
0.5 OINTMENT OPHTHALMIC 4 TIMES DAILY
Qty: 3.5 G | Refills: 2 | Status: CANCELLED | OUTPATIENT
Start: 2025-06-02

## 2025-06-02 RX ORDER — TAMSULOSIN HYDROCHLORIDE 0.4 MG/1
0.4 CAPSULE ORAL DAILY
Status: DISCONTINUED | OUTPATIENT
Start: 2025-06-03 | End: 2025-06-03 | Stop reason: HOSPADM

## 2025-06-02 RX ORDER — ACETAMINOPHEN 325 MG/1
650 TABLET ORAL EVERY 6 HOURS PRN
Status: DISCONTINUED | OUTPATIENT
Start: 2025-06-02 | End: 2025-06-03 | Stop reason: HOSPADM

## 2025-06-02 RX ORDER — DORZOLAMIDE HYDROCHLORIDE AND TIMOLOL MALEATE 20; 5 MG/ML; MG/ML
1 SOLUTION/ DROPS OPHTHALMIC 2 TIMES DAILY
Qty: 10 ML | Refills: 11 | Status: CANCELLED | OUTPATIENT
Start: 2025-06-02

## 2025-06-02 RX ORDER — ONDANSETRON 4 MG/1
4 TABLET, ORALLY DISINTEGRATING ORAL EVERY 8 HOURS PRN
Qty: 30 TABLET | Refills: 0 | Status: CANCELLED | OUTPATIENT
Start: 2025-06-02 | End: 2025-06-12

## 2025-06-02 RX ORDER — HYDROMORPHONE HCL IN WATER/PF 6 MG/30 ML
0.4 PATIENT CONTROLLED ANALGESIA SYRINGE INTRAVENOUS EVERY 5 MIN PRN
Refills: 0 | Status: DISCONTINUED | OUTPATIENT
Start: 2025-06-02 | End: 2025-06-02 | Stop reason: HOSPADM

## 2025-06-02 RX ORDER — TRANEXAMIC ACID 10 MG/ML
1 INJECTION, SOLUTION INTRAVENOUS ONCE
Status: DISCONTINUED | OUTPATIENT
Start: 2025-06-02 | End: 2025-06-02 | Stop reason: HOSPADM

## 2025-06-02 RX ORDER — SEMAGLUTIDE 0.68 MG/ML
0.25 INJECTION, SOLUTION SUBCUTANEOUS
COMMUNITY
Start: 2025-01-29

## 2025-06-02 RX ORDER — HYDROMORPHONE HCL IN WATER/PF 6 MG/30 ML
0.2 PATIENT CONTROLLED ANALGESIA SYRINGE INTRAVENOUS EVERY 5 MIN PRN
Refills: 0 | Status: DISCONTINUED | OUTPATIENT
Start: 2025-06-02 | End: 2025-06-02 | Stop reason: HOSPADM

## 2025-06-02 RX ORDER — PROPOFOL 10 MG/ML
INJECTION, EMULSION INTRAVENOUS CONTINUOUS PRN
Status: DISCONTINUED | OUTPATIENT
Start: 2025-06-02 | End: 2025-06-03

## 2025-06-02 RX ORDER — HYDROCODONE BITARTRATE AND ACETAMINOPHEN 5; 325 MG/1; MG/1
1 TABLET ORAL EVERY 6 HOURS PRN
Status: DISCONTINUED | OUTPATIENT
Start: 2025-06-02 | End: 2025-06-03 | Stop reason: HOSPADM

## 2025-06-02 RX ORDER — ONDANSETRON 2 MG/ML
4 INJECTION INTRAMUSCULAR; INTRAVENOUS EVERY 6 HOURS PRN
Status: DISCONTINUED | OUTPATIENT
Start: 2025-06-02 | End: 2025-06-03 | Stop reason: HOSPADM

## 2025-06-02 RX ORDER — TADALAFIL 10 MG/1
TABLET ORAL
COMMUNITY

## 2025-06-02 RX ORDER — PROPOFOL 10 MG/ML
INJECTION, EMULSION INTRAVENOUS PRN
Status: DISCONTINUED | OUTPATIENT
Start: 2025-06-02 | End: 2025-06-03

## 2025-06-02 RX ORDER — DIMENHYDRINATE 50 MG/ML
25 INJECTION, SOLUTION INTRAMUSCULAR; INTRAVENOUS
Status: DISCONTINUED | OUTPATIENT
Start: 2025-06-02 | End: 2025-06-02 | Stop reason: HOSPADM

## 2025-06-02 RX ORDER — NALOXONE HYDROCHLORIDE 0.4 MG/ML
0.1 INJECTION, SOLUTION INTRAMUSCULAR; INTRAVENOUS; SUBCUTANEOUS
Status: DISCONTINUED | OUTPATIENT
Start: 2025-06-02 | End: 2025-06-02 | Stop reason: HOSPADM

## 2025-06-02 RX ORDER — ERYTHROMYCIN 5 MG/G
OINTMENT OPHTHALMIC PRN
Status: DISCONTINUED | OUTPATIENT
Start: 2025-06-02 | End: 2025-06-02 | Stop reason: HOSPADM

## 2025-06-02 RX ORDER — ROSUVASTATIN CALCIUM 10 MG/1
10 TABLET, COATED ORAL DAILY
COMMUNITY
Start: 2025-01-29

## 2025-06-02 RX ORDER — HALOPERIDOL 5 MG/ML
1 INJECTION INTRAMUSCULAR
Status: DISCONTINUED | OUTPATIENT
Start: 2025-06-02 | End: 2025-06-02 | Stop reason: HOSPADM

## 2025-06-02 RX ORDER — DEXAMETHASONE SODIUM PHOSPHATE 4 MG/ML
INJECTION, SOLUTION INTRA-ARTICULAR; INTRALESIONAL; INTRAMUSCULAR; INTRAVENOUS; SOFT TISSUE PRN
Status: DISCONTINUED | OUTPATIENT
Start: 2025-06-02 | End: 2025-06-03

## 2025-06-02 RX ADMIN — ONDANSETRON 4 MG: 2 INJECTION INTRAMUSCULAR; INTRAVENOUS at 17:50

## 2025-06-02 RX ADMIN — ACETAMINOPHEN 975 MG: 325 TABLET, FILM COATED ORAL at 20:01

## 2025-06-02 RX ADMIN — HYDROMORPHONE HYDROCHLORIDE 0.2 MG: 0.2 INJECTION, SOLUTION INTRAMUSCULAR; INTRAVENOUS; SUBCUTANEOUS at 19:21

## 2025-06-02 RX ADMIN — PHENYLEPHRINE HYDROCHLORIDE 100 MCG: 10 INJECTION INTRAVENOUS at 17:39

## 2025-06-02 RX ADMIN — FENTANYL CITRATE 50 MCG: 50 INJECTION, SOLUTION INTRAMUSCULAR; INTRAVENOUS at 18:26

## 2025-06-02 RX ADMIN — PROCHLORPERAZINE EDISYLATE 5 MG: 5 INJECTION INTRAMUSCULAR; INTRAVENOUS at 20:06

## 2025-06-02 RX ADMIN — SUCCINYLCHOLINE CHLORIDE 140 MG: 20 INJECTION, SOLUTION INTRAMUSCULAR; INTRAVENOUS; PARENTERAL at 16:50

## 2025-06-02 RX ADMIN — PHENYLEPHRINE HYDROCHLORIDE 150 MCG: 10 INJECTION INTRAVENOUS at 16:58

## 2025-06-02 RX ADMIN — PROPOFOL 150 MG: 10 INJECTION, EMULSION INTRAVENOUS at 16:50

## 2025-06-02 RX ADMIN — SODIUM CHLORIDE, SODIUM LACTATE, POTASSIUM CHLORIDE, AND CALCIUM CHLORIDE: .6; .31; .03; .02 INJECTION, SOLUTION INTRAVENOUS at 16:38

## 2025-06-02 RX ADMIN — HYDROMORPHONE HYDROCHLORIDE 0.2 MG: 0.2 INJECTION, SOLUTION INTRAMUSCULAR; INTRAVENOUS; SUBCUTANEOUS at 19:33

## 2025-06-02 RX ADMIN — PHENYLEPHRINE HYDROCHLORIDE 100 MCG: 10 INJECTION INTRAVENOUS at 17:31

## 2025-06-02 RX ADMIN — FENTANYL CITRATE 50 MCG: 50 INJECTION, SOLUTION INTRAMUSCULAR; INTRAVENOUS at 18:50

## 2025-06-02 RX ADMIN — MIDAZOLAM 2 MG: 1 INJECTION INTRAMUSCULAR; INTRAVENOUS at 16:34

## 2025-06-02 RX ADMIN — PHENYLEPHRINE HYDROCHLORIDE 150 MCG: 10 INJECTION INTRAVENOUS at 17:02

## 2025-06-02 RX ADMIN — DEXAMETHASONE SODIUM PHOSPHATE 12 MG: 4 INJECTION, SOLUTION INTRAMUSCULAR; INTRAVENOUS at 16:54

## 2025-06-02 RX ADMIN — PROPOFOL 125 MCG/KG/MIN: 10 INJECTION, EMULSION INTRAVENOUS at 16:51

## 2025-06-02 RX ADMIN — CEFAZOLIN 3 G: 10 INJECTION, POWDER, FOR SOLUTION INTRAVENOUS at 16:40

## 2025-06-02 RX ADMIN — LIDOCAINE HYDROCHLORIDE 80 MG: 20 INJECTION, SOLUTION INFILTRATION; PERINEURAL at 16:50

## 2025-06-02 RX ADMIN — PHENYLEPHRINE HYDROCHLORIDE 100 MCG: 10 INJECTION INTRAVENOUS at 17:24

## 2025-06-02 RX ADMIN — TRANEXAMIC ACID 1 G: 10 INJECTION, SOLUTION INTRAVENOUS at 16:55

## 2025-06-02 RX ADMIN — FENTANYL CITRATE 50 MCG: 50 INJECTION INTRAMUSCULAR; INTRAVENOUS at 17:11

## 2025-06-02 RX ADMIN — NEOMYCIN AND POLYMYXIN B SULFATES AND DEXAMETHASONE: 3.5; 10000; 1 OINTMENT OPHTHALMIC at 21:47

## 2025-06-02 ASSESSMENT — ACTIVITIES OF DAILY LIVING (ADL)
ADLS_ACUITY_SCORE: 41
ADLS_ACUITY_SCORE: 35
ADLS_ACUITY_SCORE: 41
ADLS_ACUITY_SCORE: 41
ADLS_ACUITY_SCORE: 35
ADLS_ACUITY_SCORE: 41

## 2025-06-02 ASSESSMENT — CONF VISUAL FIELD
OD_INFERIOR_NASAL_RESTRICTION: 1
OD_SUPERIOR_NASAL_RESTRICTION: 1
OD_INFERIOR_TEMPORAL_RESTRICTION: 1
OD_SUPERIOR_TEMPORAL_RESTRICTION: 1

## 2025-06-02 ASSESSMENT — COLUMBIA-SUICIDE SEVERITY RATING SCALE - C-SSRS
6. HAVE YOU EVER DONE ANYTHING, STARTED TO DO ANYTHING, OR PREPARED TO DO ANYTHING TO END YOUR LIFE?: NO
1. IN THE PAST MONTH, HAVE YOU WISHED YOU WERE DEAD OR WISHED YOU COULD GO TO SLEEP AND NOT WAKE UP?: NO
2. HAVE YOU ACTUALLY HAD ANY THOUGHTS OF KILLING YOURSELF IN THE PAST MONTH?: NO

## 2025-06-02 ASSESSMENT — TONOMETRY
OD_IOP_MMHG: 25
OS_IOP_MMHG: 14
IOP_METHOD: ICARE

## 2025-06-02 ASSESSMENT — VISUAL ACUITY
OS_SC: 20/30
OD_SC: NLP
OS_SC+: -2
METHOD: SNELLEN - LINEAR

## 2025-06-02 ASSESSMENT — EXTERNAL EXAM - LEFT EYE: OS_EXAM: NORMAL

## 2025-06-02 ASSESSMENT — EXTERNAL EXAM - RIGHT EYE: OD_EXAM: PROPTOSIS

## 2025-06-02 ASSESSMENT — SLIT LAMP EXAM - LIDS: COMMENTS: NORMAL

## 2025-06-02 ASSESSMENT — CUP TO DISC RATIO: OD_RATIO: 0.2

## 2025-06-02 NOTE — PROGRESS NOTES
"Chief Complaints and History of Present Illnesses   Patient presents with    Swelling (Mass) Of Eye Evaluation     Chief Complaint(s) and History of Present Illness(es)     Swelling (Mass) Of Eye Evaluation    In right eye.  Onset was sudden.  This started 2.5 weeks ago.    Characterized as blurred vision and missing vision.  Occurring constantly.    Since onset it is rapidly worsening.  Associated symptoms include double   vision, eye pain, redness, tearing, headache, scalp tenderness, weight   loss, nausea, vomiting, photophobia, discharge and swelling.  Negative for   jaw claudication, shoulder/hip pain and fever.           Comments    Pt had MRI ~1 week ago, since then RLL has been very red, swollen,   bleeding, yellow discharge   Pt has been NLP right eye x 2 days  Hx diplopia right eye, \"I cannot see anything out of that eye now\"  Vomiting, unable to keep food down  Pt lost 30 lbs in the past 17 days  No fever that pt is aware of but c/o cold sweats    Pt notes hx \"tear duct repair\" left eye after being impaled with tubing   from     [place] COT 2025 10:56 AM            Imagin2025 8:06 PM CDT   MRI ORBITS:  1.  There is new marked expansion and heterogeneous enhancement within the right inferior rectus muscle extending from near its ventral insertion to the orbital apex. Appearance is most compatible with neoplasm, either primary or secondary versus lymphoma.  2.  There is associated mass effect with flattening and upper displacement of the optic nerve. No associated optic nerve signal abnormality or enhancement. There is new right-sided proptosis.    Assessment & Plan     Umair Castellanos is a 69 year old male with the following diagnoses:   1. Ocular hypertension of right eye    2. Mass of orbit       Evaluated on 25 by ophthalmology, Dr. 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 Medical conditions are stable other than progression of right orbital symptoms. PMH of HTN, HLD, T2DM, Psoriasis, Osteoarthritis. Patient has not taken oxempic for the previous approximately 10 days. Patient is stable and is appropriate risk for surgery planned for today.    A1C 1/29/25: 5.5    Medications:  Metformin, rosuvastatin, semaglutide    BMP, TSH, Lipids also reviewed and safe for surgery.    Attending Physician Attestation:  I have seen and examined this patient with the resident .  I have confirmed and edited as necessary the chief complaint(s), history of present illness, review of systems, relevant history, and examination findings as documented by others.  I have personally reviewed the relevant tests, images, and reports as documented above.  I have confirmed and edited as necessary the assessment and plan and agree with this note.    - Sky Schwartz MD 9:16 AM 6/3/2025  Today with Umair Castellanos, I reviewed the indications, risks, benefits, and alternatives of the proposed surgical procedure including, but not limited to, failure obtain the desired result  and need for additional surgery, bleeding, infection, loss of vision, loss of the eye, and the remote possibility of permanent damage to any organ system or death with the use of anesthesia.  I provided multiple opportunities for the questions, answered all questions to the best of my ability, and confirmed that my answers and my discussion were understood.     - Sky Schwartz MD 9:16 AM  6/3/2025

## 2025-06-02 NOTE — NURSING NOTE
"Chief Complaints and History of Present Illnesses   Patient presents with    Swelling (Mass) Of Eye Evaluation     Chief Complaint(s) and History of Present Illness(es)       Swelling (Mass) Of Eye Evaluation              Laterality: right eye    Onset: sudden    Onset: 2.5 weeks ago    Quality: blurred vision and missing vision    Frequency: constantly    Course: rapidly worsening    Associated symptoms: double vision, eye pain, redness, tearing, headache, scalp tenderness, weight loss, nausea, vomiting, photophobia, discharge and swelling.  Negative for jaw claudication, shoulder/hip pain and fever              Comments    Pt had MRI ~1 week ago, since then RLL has been very red, swollen, bleeding, yellow discharge   Pt has been NLP right eye x 2 days  Hx diplopia right eye, \"I cannot see anything out of that eye now\"  Vomiting, unable to keep food down  Pt lost 30 lbs in the past 17 days  No fever that pt is aware of but c/o cold sweats    Pt notes hx \"tear duct repair\" left eye after being impaled with tubing from     [place]iver[place] COT June 2, 2025 10:56 AM                     "

## 2025-06-02 NOTE — LETTER
2025         RE:  :  MRN: Umair Castellanos  1955  [id number]     Dear Dr. Calos Wang,    Thank you for asking me to see your patient, Umair Castellanos, for an oculoplastic   consultation.  My assessment and plan are below.  For further details, please see my attached clinic note.      Imagin2025 8:06 PM CDT   MRI ORBITS:  1.  There is new marked expansion and heterogeneous enhancement within the right inferior rectus muscle extending from near its ventral insertion to the orbital apex. Appearance is most compatible with neoplasm, either primary or secondary versus lymphoma.  2.  There is associated mass effect with flattening and upper displacement of the optic nerve. No associated optic nerve signal abnormality or enhancement. There is new right-sided proptosis.    Assessment & Plan     Umair Castellanos is a 69 year old male with the following diagnoses:   1. Ocular hypertension of right eye    2. Mass of orbit       Evaluated on 25 by ophthalmology, Dr. Calos Wang  Vision 20/100 and 20/70 on 25   -started on medrol dose pack, poorly tolerated by n/v   -completed dose pack   -onset of orbital swelling right eye on/around 25 follow MRI   -vision progressed to NLP in days following 25 - 25  MRI Orbit on 25 with intraorbital mass, right eye    Plan:  OR today for lateral orbitotomy with biopsy of the Right IR        Again, thank you for allowing me to participate in the care of your patient.      Sincerely,    Sky Schwartz MD  Department of Ophthalmology and Visual Neurosciences  [facility]    CC: Calos Wang MD  [facility]  [address] [place]  [facility] [address]  Via Fax: [phone]

## 2025-06-02 NOTE — H&P
[facility]    History and Physical - Hospitalist Service, GOLD TEAM        Date of Admission:  6/2/2025    Assessment & Plan      Umair Castellanos is a 69 year old male with PMH signifciant for diabetes mellitus, type 2, HLD, HTN, psoriasis, bilateral primary osteoarthritis of knee, ocular hypertension and right eye mass who was admitted on 6/2/2025 following planned orbitotomy with biopsy of right orbital mass to be monitored overnight for pain control.      Right orbital mass s/p orbitotomy with biopsy 06/02/2025  Ocular hypertension   Follows with Ophthalmology in clinic. Admitted for planned orbitotomy with biopsy of orbital mass. Underwent without complications.   -Ophthalmology consulted (signed off)   -Recommendations per Ophthalmology:   -Please leave temporary tarsorrhaphy in place until seen by ophthalmology in clinic. We will call the patient to set up follow up appointment.  -Please start maxitrol ophthalmic ointment to all sutures (at the lateral corner of the eye) and into the eye (at the nasal corner of the eye) three times daily (this has already been ordered in discharge navigator)   -Please discharge patient with short course of pain medication  -Okay to discharge from ophthalmology standpoint when cleared from a medical standpoint. Please discharge patient home with the instructions below.   -Pain:    -Acetaminophen PRN   -Norco 5mg q4h PRN (patient notes he has not previously tolerated morphine or oxycodone); patient reportedly still has 26 tablets of previously prescribed Norco at home   -Follow up pathology, flow cytometry, chromoscome analysis   -Per discussion with Ophthalmology, hold off on ASA x 48 hours from procedure     Nausea, vomiting  Weight loss, unintentional   Patient notes unintentional weight loss over the past month where he reports losing 20lbs in the setting of ongoing nausea, vomiting and inability to keep food down whenever he  "tried to take in a bite. No diarrhea, abdominal pain, dysphagia. No recent travel, new meds. Denies any heartburn types sxs. No prior similar presentation. Per review of chart, last weight taken at 01/2025 with weight of 301lb, patient is 280lb here. Possible weight loss in the setting of above with work up for possibly lymphoma vs underlying gastritis vs other. Patient also has a hx of diabetes, which has been controlled based on most recent A1c, but could also consider gastroparesis   -Start pantoprazole daily   -PRN zofran and compazine   -Could consider Nutrition consult if patient were to transfer as IP     HTN   PTA amlodipine 5mg daily and metoprolol XL 100mg daily   -Holding PTA amlodipine daily and metoprolol daily as currently normotensive     Diabetes mellitus type 2  A1c 5.5 in 01/2025. PTA metformin and semaglutide 0.25mg weekly (last received Thursday 2 weeks ago, has not been taking in the setting of nausea and vomiting).   -Continue PTA meformin 1000mg BID   -Holding PTA semaglutide while inpatient     HLD: continue PTA rosuvastatin 10mg daily   Psoriasis: PTA methotrexate 2.5mg every Sunday and folic acid; has been referred to Dermatology         Diet:  Regular   DVT Prophylaxis: Pneumatic Compression Devices  Multani Catheter: Not present  Lines: None     Cardiac Monitoring: ACTIVE order. Indication: Procedural area  Code Status:  Full Code     Clinically Significant Risk Factors Present on Admission                 # Drug Induced Platelet Defect: home medication list includes an antiplatelet medication   # Hypertension: Noted on problem list           # Morbid Obesity: Estimated body mass index is 40.3 kg/m  as calculated from the following:    Height as of this encounter: 1.778 m (5' 10\").    Weight as of this encounter: 127.4 kg (280 lb 13.9 oz).              Disposition Plan     Medically Ready for Discharge: Anticipated Tomorrow         The patient's care was discussed with the Attending " Physician, Dr. Hunt, Bedside Nurse, Patient, and Ophthalmology Consultant(s).    Karyn Cochran PA-C  Hospitalist Service, [facility]  Securely message with [employer] (more info)  Text page via [employer] Paging/Directory   See signed in provider for up to date coverage information    ______________________________________________________________________    Chief Complaint   Eye pain     History is obtained from the patient    History of Present Illness   Umair Castellanos is a 69 year old male with PMH signifciant for diabetes mellitus, type 2, HLD, HTN, psoriasis, bilateral primary osteoarthritis of knee, ocular hypertension and right eye mass who was admitted on 6/2/2025 following planned orbitotomy with biopsy of right orbital mass.     Continue to have eye pain. Rates as a 7/10. Similar to prior pain before surgery. Notes he was never able to tolerate Norco as he has been struggling with nausea and vomiting with any intake for the past 1 months nearly. Anytime he tried to eat, it will come back up or even a sip of water. No abdominal pain. No nausea otherwise. No dysphagia or odynphagia. Never had similar symptoms in the past. Was nauseated after OR, but now ok. No abdominal pain. Hasn't had a bowel movement in 5 days, but hasn't been able to eat. Has not tried any meds for nausea in the past. No other symptoms. No chest pain, dyspnea, cough, loose stools, urinary sxs, swelling in legs, new rashes.       Past Medical History    Past Medical History:   Diagnosis Date    Blood transfusion     Chronic headaches     Concussion     multiple    Hypertension     Obesity     Syncope, cardiogenic        Past Surgical History   Past Surgical History:   Procedure Laterality Date    ARTHROPLASTY KNEE      COLONOSCOPY  07/28/2011    Procedure:COLONOSCOPY; Surgeon:VERONIQUE CHIN; Location:[facility]    COLONOSCOPY,REMV LESN,FORCEP/CAUTERY      LARYNGOSCOPY, EXCISE  VOCAL CORD LESION COMPLEX, COMBINED      NASOLACRIMAL DUCT PROBE/IRRG         Prior to Admission Medications   Prior to Admission Medications   Prescriptions Last Dose Informant Patient Reported? Taking?   amLODIPine (NORVASC) 5 MG tablet Past Week  No Yes   Sig: Take 1 tablet (5 mg) by mouth daily (Needs follow-up appointment for this medication)   aspirin 81 MG tablet Past Week Self Yes Yes   Sig: Take 1 tablet by mouth daily.   metFORMIN (GLUCOPHAGE) 500 MG tablet Past Week  Yes Yes   Sig: Take 1,000 mg by mouth.   methotrexate 2.5 MG tablet Past Week  Yes Yes   Sig: Take SIX 2.5mg tablets once weekly   metoprolol tartrate (LOPRESSOR) 50 MG tablet Past Week  No Yes   Sig: Take 1 tablet (50 mg) by mouth 2 times daily (Needs follow-up appointment for this medication)   rosuvastatin (CRESTOR) 10 MG tablet Past Week  Yes Yes   Sig: Take 10 mg by mouth daily.   semaglutide (OZEMPIC, 0.25 OR 0.5 MG/DOSE,) 2 MG/3ML pen Past Week  Yes Yes   Sig: Inject 0.25 mg subcutaneously.   tadalafil (CIALIS) 10 MG tablet Past Week  Yes Yes   tamsulosin (FLOMAX) 0.4 MG capsule Past Week  No Yes   Sig: Take 1 capsule (0.4 mg) by mouth daily      Facility-Administered Medications: None        Review of Systems    The 10 point Review of Systems is negative other than noted in the HPI or here.     Social History   I have reviewed this patient's social history and updated it with pertinent information if needed.  Social History     Tobacco Use    Smoking status: Never    Smokeless tobacco: Never   Substance Use Topics    Alcohol use: Yes     Alcohol/week: 0.8 standard drinks of alcohol     Types: 1 Cans of beer per week     Comment: ONE OR TWO WEEKLY    Drug use: No         Family History   I have reviewed this patient's family history and updated it with pertinent information if needed.  Family History   Problem Relation Age of Onset    Hypertension Mother     Cerebrovascular Disease Mother     Glaucoma Mother     Diabetes Father      Hearing Loss Father     High cholesterol Father     Macular Degeneration Father     Hypertension Father     Glaucoma Father     Heart Disease Maternal Grandmother     Heart Disease Maternal Grandfather     Heart Disease Paternal Grandmother     Kidney Disease Paternal Grandmother     Heart Disease Brother 64        AORTIC VALVE REPLACEMENT    Arthritis Sister     Neurologic Disorder Sister         migraines    Cancer Maternal Uncle     Heart Disease Paternal Aunt          Allergies   No Known Allergies     Physical Exam   Vital Signs: Temp: 98.1  F (36.7  C) Temp src: Oral BP: (!) 107/90 Pulse: 50   Resp: 16 SpO2: 96 % O2 Device: None (Room air)    Weight: 280 lbs 13.86 oz    General: laying in bed, alert, cooperative, awake, in no acute distress  HEENT: normocephalic, atraumatic, left eye anicteric, dressing in place on left eye; sutures in place   Respiratory: breathing comfortably on room air, clear to auscultation bilaterally without wheezing, crackles, or rhonchi appreciated  Cardiac: regular rate and rhythm with normal S1/S2 without murmurs, clicks, rubs or gallops, 2+ radial pulse on LUE, no signs of peripheral edema bilaterally  GI: soft, non-distended, normoactive bowel sounds, non-tender per palpation  Neuro: grossly non-focal, alert and oriented, normal speech  MSK: no bony deformities, moving all extremities independently  Skin: no rashes or lesions on uncovered surfaces, no jaundice      Medical Decision Making       70 MINUTES SPENT BY ME on the date of service doing chart review, history, exam, documentation & further activities per the note.      Data   NOTE: Data reviewed over the past 24 hrs contributes toward MDM complexity

## 2025-06-02 NOTE — ANESTHESIA PROCEDURE NOTES
Airway       Patient location during procedure: OR       Procedure Start/Stop Times: 6/2/2025 4:51 PM  Staff -        CRNA: Alison Chang APRN CRNA       Performed By: CRNA  Consent for Airway        Urgency: elective  Indications and Patient Condition       Indications for airway management: diane-procedural       Induction type:RSI       Mask difficulty assessment: 0 - not attempted    Final Airway Details       Final airway type: endotracheal airway       Successful airway: ETT - single  Endotracheal Airway Details        ETT size (mm): 8.0       Cuffed: yes       Successful intubation technique: video laryngoscopy       VL Blade Size: Glidescope 4       Grade View of Cords: 1       Adjucts: stylet       Position: Right       Measured from: gums/teeth       Secured at (cm): 23       Bite block used: None    Post intubation assessment        Placement verified by: capnometry, equal breath sounds and chest rise        Number of attempts at approach: 1       Secured with: silk tape       Ease of procedure: easy       Dentition: Intact and Unchanged    Medication(s) Administered   Medication Administration Time: 6/2/2025 4:51 PM

## 2025-06-02 NOTE — PROGRESS NOTES
Brief Ophthalmology Update    POD0 s/p orbitotomy with biopsy of orbital mass, right orbit  -Please leave temporary tarsorrhaphy in place until seen by ophthalmology in clinic. We will call the patient to set up follow up appointment.  -Please start maxitrol ophthalmic ointment to all sutures (at the lateral corner of the eye) and into the eye (at the nasal corner of the eye) three times daily  -Please discharge patient with short course of pain medication  -Okay to discharge from ophthalmology standpoint when cleared from a medical standpoint. Please discharge patient home with the instructions below.   -Ophthalmology will sign off at this time. Please page the on call resident with any questions or concerns.    Shanta Jenkins MD  Oculoplastic Surgery Fellow, [facility]      Post-operative Instructions    Ophthalmic Plastic and Reconstructive Surgery  Sky Schwartz M.D.  Shanta Jenkins M.D.    All instructions apply to the operated eye(s) or eyelid(s)      What to expect after surgery:  There will be some swelling, bruising, and likely a black eye (even into the lower eyelids and cheeks). Also expect crusting and discharge from the eye and/or incisions.   A small amount of surface bleeding is normal for the first 48 hours after surgery.  You may notice some bloody tears for the first few days after surgery. This is normal.  Your eye(s) and eyelid(s) may be painful and tender. This is normal after surgery. Use the pain medication as prescribed. If your pain does not improve despite the medication, contact the office.    Wound care and personal care:  If a patch or bandage has been placed, please leave this in place until seen in clinic. Prevent the bandage from getting wet.   Apply ice compresses 15 minutes on 15 minutes off while awake for the first 2 days after surgery, then switch to warm compresses 4 times a day until seen by your physician.   For warm packs you can place a cup of dry uncooked  rice in a clean cotton sock. Place sock in microwave 30 seconds to one minute. Next place the warm sock into a plastic bag and wrap the bag with clean warm wet washcloth and place over operated eye.    You may shower or wash your hair the day after surgery. Do not bathe or go swimming for 1 week to prevent contamination of your wounds.    Activity restrictions and driving:  Avoid heavy lifting, bending, exercise or strenuous activity for 1 week after surgery.  You may resume other activities and return to work as tolerated.  You may not resume driving until have you stopped using narcotic pain medications (such as Norco, Percocet, Tylenol #3).    Medications:  Restart all your regular home medications and eye drops today. If you take Plavix or Aspirin on a regular basis, wait for 3 days after your surgery before restarting these in order to decrease the risk of bleeding complications.  Avoid aspirin and aspirin-like medications (Motrin, Aleve, Ibuprofen, Karie-[place] etc) for 5 days to reduce the risk of bleeding. You may take Tylenol (acetaminophen) for pain.  In addition to your home medications, take the following post-operative medications as prescribed by your physician:  Apply antibiotic ointment (maxitrol ointment) to all sutures (at the lateral corner of the eye) and into the eye (at the nasal corner of the eye) three times a day  Take scheduled extra strength Tylenol for pain.  You may take 1 to 2 pain pills (norco or oxycodone as prescribed) as needed for breakthrough pain up to every 6 hours.  The pain pills may make you drowsy. You must not drive a car, operate heavy machinery or drink alcohol while taking them.  The pain pills may cause constipation and nausea. Take them with some food to prevent a stomach upset. If you continue to experience nausea, call your physician.    WARNING: All the prescription pain medications listed above contain Tylenol (acetaminophen). You must not take more than 4,000 mg  of acetaminophen per 24-hour period. This is equivalent to 6 tablets of Darvocet, 8 tablets of Vicodin, or 12 tablets of Norco, Percocet or Tylenol #3. If you take other over-the-counter medications containing acetaminophen, you must take the amount of acetaminophen into account and reduce the number of prescribed pain pills accordingly.    Contact information and follow-up:  Return to the Eye Clinic for a follow-up appointment with your physician as scheduled. If no appointment has been scheduled, call [phone] for an appointment with Dr. Schwartz within 1 to 2 weeks from your date of surgery.    For severe pain, bleeding, or loss of vision, call the Eye Clinic at [phone].  After hours or on weekends and holidays, call [phone] and ask to speak with the ophthalmologist on call.

## 2025-06-02 NOTE — OP NOTE
PREOPERATIVE DIAGNOSIS:  Right  orbital mass.      POSTOPERATIVE DIAGNOSIS:  Right orbital mass.      PROCEDURE:  Right orbitotomy with biopsy.  Right temporary lateral tarsorrhaphy.    SURGEON: Sky Schwartz MD    ASSISTANT:  Shanta Jenkins MD, JACQUELIN and Erick Melchor MD       ANESTHESIA:  General with local infiltration of 1% lidocaine with epinephrine.      COMPLICATIONS:  None.      ESTIMATED BLOOD LOSS:  Less than 5 mL.     SPECIMENS:  Right  orbital mass in saline for fresh tissue evaluation and lymphoma workup.      HISTORY:  Umair Castellanos  presented with a mass in the right superior orbit..  After the risks, benefits and alternatives to the proposed procedure were explained, informed consent was obtained.      DESCRIPTION OF PROCEDURE:  Umair Castellanos was brought to the operating room and placed supine on the operating table.  General anesthesia was induced.  The right lateral canthus, lower lid and conjunctiva was infiltrated with local anesthetic.  The area was prepped and draped in the typical sterile ophthalmic fashion.  Attention was directed to the lower eyelid. Lateral canthal incision was made with the #15 blade. A transconjunctival incision was made in the fornix with monopolar cautery.  Desmarres retractor was used to protect the eyelid and retract inferiorly. Orbital septum was opened horizontally.  A firm mass was identified in the inferior orbit. A malleable retractors were placed to retract the globe.  Biopsy was obtained using Wilfredo scissors.  The area was infiltrated with Kenalog. Surgicel was placed.   The biopsy was placed in saline for fresh tissue evaluation for possible lymphoma.  Meticulous hemostasis was obtained..The lateral canthus was reconstructed with 5-0 Vicryl suture. The skin was closed with 6-0 plain gut sutures. Two temporary tarsorrhaphies were placed medially and laterally with 5-0 Prolene sutures tied over Telfa bolsters.   Erythromycin ointment was applied to  the eye. Umair Castellanos  tolerated the procedure well. and left the operating room in stable condition.         LC REYNOSO MD

## 2025-06-02 NOTE — BRIEF OP NOTE
[facility]    Brief Operative Note    Pre-operative diagnosis: Mass of orbit [H05.89]  Post-operative diagnosis Same as pre-operative diagnosis    Procedure: ORBITOTOMY WITH BIOPSY and decompression, Right - Eye    Surgeon: Surgeons and Role:     * Sky Schwartz MD - Primary     * Shanta Jenkins MD - Resident - Assisting     * Erick Lo MD - Resident - Assisting  Anesthesia: General   Estimated Blood Loss: Minimal    Drains: None  Specimens:   ID Type Source Tests Collected by Time Destination   1 : Right Orbital Mass Tissue Eye, Right SURGICAL PATHOLOGY EXAM Sky Schwartz MD 6/2/2025  5:20 PM      Findings:   None.  Complications: None.  Implants: * No implants in log *

## 2025-06-02 NOTE — ANESTHESIA PREPROCEDURE EVALUATION
Anesthesia Pre-Procedure Evaluation    Patient: Umair Castellanos   MRN: [id number] : 1955          Procedure : Procedure(s):  ORBITOTOMY WITH BIOPSY         Past Medical History:   Diagnosis Date    Blood transfusion     Chronic headaches     Concussion     multiple    Hypertension     Obesity     Syncope, cardiogenic       Past Surgical History:   Procedure Laterality Date    ARTHROPLASTY KNEE      COLONOSCOPY  2011    Procedure:COLONOSCOPY; Surgeon:VERONIQUE CHIN; Location:[facility]    COLONOSCOPY,REMV LESN,FORCEP/CAUTERY      LARYNGOSCOPY, EXCISE VOCAL CORD LESION COMPLEX, COMBINED      NASOLACRIMAL DUCT PROBE/IR[facility]        No Known Allergies   Social History     Tobacco Use    Smoking status: Never    Smokeless tobacco: Never   Substance Use Topics    Alcohol use: Yes     Alcohol/week: 0.8 standard drinks of alcohol     Types: 1 Cans of beer per week     Comment: ONE OR TWO WEEKLY      Wt Readings from Last 1 Encounters:   25 127.4 kg (280 lb 13.9 oz)        Anesthesia Evaluation   Pt has had prior anesthetic.     History of anesthetic complications  - PONV.      ROS/MED HX  ENT/Pulmonary: Comment:   Ocular hypertension of right eye    Mass of orbit           Neurologic:       Cardiovascular:     (+) Dyslipidemia hypertension- -   -  - -             fainting (syncope) (has a loop recorder).                         METS/Exercise Tolerance:     Hematologic:       Musculoskeletal:       GI/Hepatic:       Renal/Genitourinary:       Endo:     (+)  type II DM,             Obesity,       Psychiatric/Substance Use:       Infectious Disease:       Malignancy:       Other:              Physical Exam  Airway  Mallampati: III  TM distance: >3 FB  Neck ROM: full  Mouth opening: >= 4 cm    Cardiovascular - normal exam   Dental   (+) Modest Abnormalities - crowns, retainers, 1 or 2 missing teeth      Pulmonary - normal exam      Neurological - normal exam  He appears awake, alert and oriented x3.    Other  "Findings       OUTSIDE LABS:  CBC:   Lab Results   Component Value Date    WBC 8.3 04/10/2013    WBC 8.1 12/17/2010    HGB 16.6 04/10/2013    HGB 15.1 12/17/2010    HCT 47.2 04/10/2013    HCT 42.8 12/17/2010     04/10/2013     12/17/2010     BMP:   Lab Results   Component Value Date     06/26/2017     05/10/2016    POTASSIUM 4.4 06/26/2017    POTASSIUM 3.8 05/10/2016    CHLORIDE 104 06/26/2017    CHLORIDE 106 05/10/2016    CO2 30 06/26/2017    CO2 26 05/10/2016    BUN 20 06/26/2017    BUN 18 05/10/2016    CR 0.95 06/26/2017    CR 0.66 05/10/2016     (H) 06/26/2017     (H) 05/10/2016     COAGS:   Lab Results   Component Value Date    PTT 31 12/16/2010    INR 1.06 12/16/2010     POC: No results found for: \"BGM\", \"HCG\", \"HCGS\"  HEPATIC:   Lab Results   Component Value Date    ALBUMIN 4.8 04/10/2013    PROTTOTAL 8.3 04/10/2013    ALT 87 (H) 04/10/2013    AST 58 (H) 04/10/2013    ALKPHOS 74 04/10/2013    BILITOTAL 1.7 (H) 04/10/2013     OTHER:   Lab Results   Component Value Date    A1C 6.0 06/26/2017    OLVIN 9.7 06/26/2017    MAG 2.3 12/14/2010    LIPASE 110 12/16/2010    TSH 2.17 12/14/2010    CRP <5.0 12/14/2010    SED 9 12/14/2010       Anesthesia Plan    ASA Status:  3      NPO Status: NPO Appropriate   Anesthesia Type: General.  Airway: oral.  Induction: intravenous.  Maintenance: TIVA.   Techniques and Equipment:       - Monitoring Plan: standard ASA monitoring     Consents    Anesthesia Plan(s) and associated risks, benefits, and realistic alternatives discussed. Questions answered and patient/representative(s) expressed understanding.     - Discussed: anesthesiologist     - Discussed with:  Patient               Postoperative Care         Comments:                   Gordo Adams MD    I have reviewed the pertinent notes and labs in the chart from the past 30 days and (re)examined the patient.  Any updates or changes from those notes are reflected in this " "note.    Clinically Significant Risk Factors Present on Admission                 # Drug Induced Platelet Defect: home medication list includes an antiplatelet medication   # Hypertension: Noted on problem list           # Morbid Obesity: Estimated body mass index is 40.3 kg/m  as calculated from the following:    Height as of this encounter: 1.778 m (5' 10\").    Weight as of this encounter: 127.4 kg (280 lb 13.9 oz).                    "

## 2025-06-03 ENCOUNTER — TELEPHONE (OUTPATIENT)
Dept: OPHTHALMOLOGY | Facility: CLINIC | Age: 70
End: 2025-06-03

## 2025-06-03 VITALS
RESPIRATION RATE: 16 BRPM | SYSTOLIC BLOOD PRESSURE: 103 MMHG | TEMPERATURE: 98 F | OXYGEN SATURATION: 95 % | HEIGHT: 70 IN | HEART RATE: 80 BPM | BODY MASS INDEX: 40.21 KG/M2 | WEIGHT: 280.87 LBS | DIASTOLIC BLOOD PRESSURE: 76 MMHG

## 2025-06-03 LAB
ALBUMIN SERPL BCG-MCNC: 4.1 G/DL (ref 3.5–5.2)
ALP SERPL-CCNC: 58 U/L (ref 40–150)
ALT SERPL W P-5'-P-CCNC: 33 U/L (ref 0–70)
ANION GAP SERPL CALCULATED.3IONS-SCNC: 14 MMOL/L (ref 7–15)
ANION GAP SERPL CALCULATED.3IONS-SCNC: 14 MMOL/L (ref 7–15)
AST SERPL W P-5'-P-CCNC: 37 U/L (ref 0–45)
BASOPHILS # BLD AUTO: 0 10E3/UL (ref 0–0.2)
BASOPHILS NFR BLD AUTO: 0 %
BILIRUB SERPL-MCNC: 2.4 MG/DL
BUN SERPL-MCNC: 27.8 MG/DL (ref 8–23)
BUN SERPL-MCNC: 27.8 MG/DL (ref 8–23)
CALCIUM SERPL-MCNC: 9.7 MG/DL (ref 8.8–10.4)
CALCIUM SERPL-MCNC: 9.7 MG/DL (ref 8.8–10.4)
CHLORIDE SERPL-SCNC: 100 MMOL/L (ref 98–107)
CHLORIDE SERPL-SCNC: 100 MMOL/L (ref 98–107)
CREAT SERPL-MCNC: 1.05 MG/DL (ref 0.67–1.17)
CREAT SERPL-MCNC: 1.05 MG/DL (ref 0.67–1.17)
EGFRCR SERPLBLD CKD-EPI 2021: 76 ML/MIN/1.73M2
EGFRCR SERPLBLD CKD-EPI 2021: 76 ML/MIN/1.73M2
EOSINOPHIL # BLD AUTO: 0 10E3/UL (ref 0–0.7)
EOSINOPHIL NFR BLD AUTO: 0 %
ERYTHROCYTE [DISTWIDTH] IN BLOOD BY AUTOMATED COUNT: 14.5 % (ref 10–15)
GLUCOSE SERPL-MCNC: 153 MG/DL (ref 70–99)
GLUCOSE SERPL-MCNC: 153 MG/DL (ref 70–99)
HCO3 SERPL-SCNC: 22 MMOL/L (ref 22–29)
HCO3 SERPL-SCNC: 22 MMOL/L (ref 22–29)
HCT VFR BLD AUTO: 43.8 % (ref 40–53)
HGB BLD-MCNC: 15.5 G/DL (ref 13.3–17.7)
IMM GRANULOCYTES # BLD: 0.1 10E3/UL
IMM GRANULOCYTES NFR BLD: 1 %
LIPASE SERPL-CCNC: 21 U/L (ref 13–60)
LYMPHOCYTES # BLD AUTO: 7.6 10E3/UL (ref 0.8–5.3)
LYMPHOCYTES NFR BLD AUTO: 51 %
MAGNESIUM SERPL-MCNC: 1.9 MG/DL (ref 1.7–2.3)
MCH RBC QN AUTO: 33 PG (ref 26.5–33)
MCHC RBC AUTO-ENTMCNC: 35.4 G/DL (ref 31.5–36.5)
MCV RBC AUTO: 93 FL (ref 78–100)
MCV RBC AUTO: 94 FL (ref 78–100)
MONOCYTES # BLD AUTO: 0.2 10E3/UL (ref 0–1.3)
MONOCYTES NFR BLD AUTO: 2 %
NEUTROPHILS # BLD AUTO: 7.1 10E3/UL (ref 1.6–8.3)
NEUTROPHILS NFR BLD AUTO: 47 %
NRBC # BLD AUTO: 0 10E3/UL
NRBC BLD AUTO-RTO: 0 /100
PATH REPORT.COMMENTS IMP SPEC: ABNORMAL
PATH REPORT.COMMENTS IMP SPEC: YES
PATH REPORT.FINAL DX SPEC: ABNORMAL
PATH REPORT.MICROSCOPIC SPEC OTHER STN: ABNORMAL
PATH REPORT.RELEVANT HX SPEC: ABNORMAL
PLAT MORPH BLD: ABNORMAL
PLATELET # BLD AUTO: 87 10E3/UL (ref 150–450)
POTASSIUM SERPL-SCNC: 4.4 MMOL/L (ref 3.4–5.3)
POTASSIUM SERPL-SCNC: 4.4 MMOL/L (ref 3.4–5.3)
PROT SERPL-MCNC: 6.6 G/DL (ref 6.4–8.3)
RBC # BLD AUTO: 4.69 10E6/UL (ref 4.4–5.9)
RBC MORPH BLD: ABNORMAL
SODIUM SERPL-SCNC: 136 MMOL/L (ref 135–145)
SODIUM SERPL-SCNC: 136 MMOL/L (ref 135–145)
VARIANT LYMPHS BLD QL SMEAR: PRESENT
WBC # BLD AUTO: 15 10E3/UL (ref 4–11)
WBC # BLD AUTO: 15.1 10E3/UL (ref 4–11)

## 2025-06-03 PROCEDURE — 83735 ASSAY OF MAGNESIUM: CPT | Performed by: HOSPITALIST

## 2025-06-03 PROCEDURE — 99239 HOSP IP/OBS DSCHRG MGMT >30: CPT | Mod: FS | Performed by: STUDENT IN AN ORGANIZED HEALTH CARE EDUCATION/TRAINING PROGRAM

## 2025-06-03 PROCEDURE — 83690 ASSAY OF LIPASE: CPT

## 2025-06-03 PROCEDURE — 250N000013 HC RX MED GY IP 250 OP 250 PS 637: Performed by: PHYSICIAN ASSISTANT

## 2025-06-03 PROCEDURE — 85048 AUTOMATED LEUKOCYTE COUNT: CPT | Performed by: PHYSICIAN ASSISTANT

## 2025-06-03 PROCEDURE — 99239 HOSP IP/OBS DSCHRG MGMT >30: CPT

## 2025-06-03 PROCEDURE — 85025 COMPLETE CBC W/AUTO DIFF WBC: CPT | Performed by: PHYSICIAN ASSISTANT

## 2025-06-03 PROCEDURE — G0378 HOSPITAL OBSERVATION PER HR: HCPCS

## 2025-06-03 PROCEDURE — 80053 COMPREHEN METABOLIC PANEL: CPT | Performed by: PHYSICIAN ASSISTANT

## 2025-06-03 PROCEDURE — 36415 COLL VENOUS BLD VENIPUNCTURE: CPT | Performed by: HOSPITALIST

## 2025-06-03 RX ORDER — NEOMYCIN SULFATE, POLYMYXIN B SULFATE, AND DEXAMETHASONE 3.5; 10000; 1 MG/G; [USP'U]/G; MG/G
0.5 OINTMENT OPHTHALMIC 3 TIMES DAILY
Qty: 3.5 G | Refills: 0 | Status: SHIPPED | OUTPATIENT
Start: 2025-06-03 | End: 2025-06-03

## 2025-06-03 RX ORDER — OXYCODONE HYDROCHLORIDE 5 MG/1
5 TABLET ORAL EVERY 8 HOURS PRN
Qty: 9 TABLET | Refills: 0 | Status: SHIPPED | OUTPATIENT
Start: 2025-06-03 | End: 2025-06-03

## 2025-06-03 RX ORDER — OXYCODONE HYDROCHLORIDE 5 MG/1
5 TABLET ORAL EVERY 8 HOURS PRN
Qty: 9 TABLET | Refills: 0 | Status: ACTIVE | OUTPATIENT
Start: 2025-06-03

## 2025-06-03 RX ORDER — NEOMYCIN SULFATE, POLYMYXIN B SULFATE, AND DEXAMETHASONE 3.5; 10000; 1 MG/G; [USP'U]/G; MG/G
0.5 OINTMENT OPHTHALMIC 3 TIMES DAILY
Qty: 3.5 G | Refills: 0 | Status: ACTIVE | OUTPATIENT
Start: 2025-06-03

## 2025-06-03 RX ORDER — ONDANSETRON 4 MG/1
4 TABLET, ORALLY DISINTEGRATING ORAL EVERY 6 HOURS PRN
Qty: 20 TABLET | Refills: 0 | Status: SHIPPED | OUTPATIENT
Start: 2025-06-03 | End: 2025-06-03

## 2025-06-03 RX ORDER — ASPIRIN 81 MG/1
81 TABLET ORAL DAILY
Status: ACTIVE | COMMUNITY
Start: 2025-06-08

## 2025-06-03 RX ORDER — ACETAMINOPHEN 325 MG/1
650 TABLET ORAL EVERY 6 HOURS PRN
Status: ACTIVE | COMMUNITY
Start: 2025-06-03

## 2025-06-03 RX ORDER — ONDANSETRON 4 MG/1
4 TABLET, ORALLY DISINTEGRATING ORAL EVERY 6 HOURS PRN
Qty: 20 TABLET | Refills: 0 | Status: ACTIVE | OUTPATIENT
Start: 2025-06-03

## 2025-06-03 RX ADMIN — NEOMYCIN AND POLYMYXIN B SULFATES AND DEXAMETHASONE: 3.5; 10000; 1 OINTMENT OPHTHALMIC at 06:46

## 2025-06-03 RX ADMIN — ROSUVASTATIN CALCIUM 10 MG: 5 TABLET, FILM COATED ORAL at 07:43

## 2025-06-03 RX ADMIN — METFORMIN HYDROCHLORIDE 1000 MG: 500 TABLET ORAL at 07:43

## 2025-06-03 RX ADMIN — PANTOPRAZOLE SODIUM 40 MG: 40 TABLET, DELAYED RELEASE ORAL at 06:45

## 2025-06-03 RX ADMIN — TAMSULOSIN HYDROCHLORIDE 0.4 MG: 0.4 CAPSULE ORAL at 07:43

## 2025-06-03 ASSESSMENT — ACTIVITIES OF DAILY LIVING (ADL)
ADLS_ACUITY_SCORE: 35

## 2025-06-03 NOTE — PROGRESS NOTES
"Blood pressure 116/64, pulse 72, temperature 97.3  F (36.3  C), temperature source Oral, resp. rate 16, height 1.778 m (5' 10\"), weight 127.4 kg (280 lb 13.9 oz), SpO2 95%.     Patient reports pain of a 5 in the head region but is not requesting any pain medication due to the pain level being tolerable. Patient experiencing nausea but no medication intervention needed. Cpap used at home. Monitoring oxygen level and will use nasal cannula when pt is sleeping if needed. No voiding recorded. A&OX4. Right PIV. Patients wife was visiting and left shortly after pt was admitted.Surgical incision sight has small evidence of sanguineous drainage. Patient verbalized pain is decreasing on surgical site. Assist of 1. SCD on bilaterally. Reg diet, reports no appetite.     Observation Goals:   -diagnostic tests and consults completed and resulted- In process  -vital signs normal or at patient baseline- Met   -tolerating oral intake to maintain hydration- Met   -adequate pain control on oral analgesics- Met   -returns to baseline functional status- In progress  -safe disposition plan has been identified- Met     Plan: Follow POC, manage pain.   "

## 2025-06-03 NOTE — PLAN OF CARE
Goal Outcome Evaluation:  -diagnostic tests and consults completed and resulted: Not met  -vital signs normal or at patient baseline: Met   -tolerating oral intake to maintain hydration: Met   -adequate pain control on oral analgesics: Met   -returns to baseline functional status: Not met   -safe disposition plan has been identified: Not met

## 2025-06-03 NOTE — ANESTHESIA POSTPROCEDURE EVALUATION
Patient: Umair Castellanos    Procedure: Procedure(s):  ORBITOTOMY WITH BIOPSY and decompression       Anesthesia Type:  General    Note:  Disposition: Admission   Postop Pain Control: Uneventful            Sign Out: Well controlled pain   PONV: No   Neuro/Psych: Uneventful            Sign Out: Acceptable/Baseline neuro status   Airway/Respiratory: Uneventful            Sign Out: Acceptable/Baseline resp. status   CV/Hemodynamics: Uneventful            Sign Out: Acceptable CV status; No obvious hypovolemia; No obvious fluid overload   Other NRE: NONE   DID A NON-ROUTINE EVENT OCCUR? No           Last vitals:  Vitals Value Taken Time   /75 06/02/25 20:30   Temp 36.5  C (97.7  F) 06/02/25 20:00   Pulse 73 06/02/25 20:41   Resp 16 06/02/25 20:41   SpO2 95 % 06/02/25 20:41   Vitals shown include unfiled device data.    Electronically Signed By: Angie Obrien MD  June 2, 2025  8:46 PM

## 2025-06-03 NOTE — ANESTHESIA CARE TRANSFER NOTE
Patient: Umair Castellanos    Procedure: Procedure(s):  ORBITOTOMY WITH BIOPSY and decompression       Diagnosis: Mass of orbit [H05.89]  Diagnosis Additional Information: No value filed.    Anesthesia Type:   General     Note:    Oropharynx: oropharynx clear of all foreign objects  Level of Consciousness: drowsy  Oxygen Supplementation: face mask  Level of Supplemental Oxygen (L/min / FiO2): 6  Independent Airway: airway patency satisfactory and stable  Dentition: dentition unchanged  Vital Signs Stable: post-procedure vital signs reviewed and stable  Report to RN Given: handoff report given  Patient transferred to: PACU    Handoff Report: Identifed the Patient, Identified the Reponsible Provider, Reviewed the pertinent medical history, Discussed the surgical course, Reviewed Intra-OP anesthesia mangement and issues during anesthesia, Set expectations for post-procedure period and Allowed opportunity for questions and acknowledgement of understanding      Vitals:  Vitals Value Taken Time   /75 06/02/25 20:30   Temp 36.5  C (97.7  F) 06/02/25 20:00   Pulse 73 06/02/25 20:41   Resp 16 06/02/25 20:41   SpO2 95 % 06/02/25 20:41   Vitals shown include unfiled device data.    Electronically Signed By: Edith Graff CRNA, APRN CRNA  Deborah 3, 2025  9:18 AM

## 2025-06-03 NOTE — DISCHARGE SUMMARY
"[facility]  Hospitalist Discharge Summary      Date of Admission:  6/2/2025  Date of Discharge:  6/3/2025  Discharging Provider: QASIM Lara CNP  Discharge Service: Hospitalist Service, GOLD TEAM     Discharge Diagnoses     Right orbital mass s/p orbitotomy with biopsy 06/02/2025  Ocular hypertension   Nausea, vomiting, resolved  Weight loss, unintentional, resolved  HTN   Diabetes mellitus type 2  HLD  Psoriasis  Leukocytosis  Elevated liver chemistries, chronic    Clinically Significant Risk Factors     # Morbid Obesity: Estimated body mass index is 40.3 kg/m  as calculated from the following:    Height as of this encounter: 1.778 m (5' 10\").    Weight as of this encounter: 127.4 kg (280 lb 13.9 oz).       Follow-ups Needed After Discharge   Dear Umair Castellanos    Your were hospitalized at [facility] with an orbitotomy with biopsy of orbital mass. You were admitted to the observation unit overnight.  Over your hospitalization your pain improved and today you are ready to be discharged.  If you continue with the instructions from the Opthalmology team as below, you should continues to improve. If you have any symptoms of infection or worsening eye pain please seek medical attention.    Post-operative Instructions     Ophthalmic Plastic and Reconstructive Surgery  Sky Schwartz M.D.  Shanta Jenkins M.D.     All instructions apply to the operated eye(s) or eyelid(s)        What to expect after surgery:  There will be some swelling, bruising, and likely a black eye (even into the lower eyelids and cheeks). Also expect crusting and discharge from the eye and/or incisions.   A small amount of surface bleeding is normal for the first 48 hours after surgery.  You may notice some bloody tears for the first few days after surgery. This is normal.  Your eye(s) and eyelid(s) may be painful and tender. This is normal after surgery. Use " the pain medication as prescribed. If your pain does not improve despite the medication, contact the office.     Wound care and personal care:  If a patch or bandage has been placed, please leave this in place until seen in clinic. Prevent the bandage from getting wet.   Apply ice compresses 15 minutes on 15 minutes off while awake for the first 2 days after surgery, then switch to warm compresses 4 times a day until seen by your physician.   For warm packs you can place a cup of dry uncooked rice in a clean cotton sock. Place sock in microwave 30 seconds to one minute. Next place the warm sock into a plastic bag and wrap the bag with clean warm wet washcloth and place over operated eye.    You may shower or wash your hair the day after surgery. Do not bathe or go swimming for 1 week to prevent contamination of your wounds.     Activity restrictions and driving:  Avoid heavy lifting, bending, exercise or strenuous activity for 1 week after surgery.  You may resume other activities and return to work as tolerated.  You may not resume driving until have you stopped using narcotic pain medications (such as Norco, Percocet, Tylenol #3).     Medications:  Restart all your regular home medications and eye drops today. If you take Plavix or Aspirin on a regular basis, wait for 3 days after your surgery before restarting these in order to decrease the risk of bleeding complications.  Avoid aspirin and aspirin-like medications (Motrin, Aleve, Ibuprofen, Karie-Pray etc) for 5 days to reduce the risk of bleeding. You may take Tylenol (acetaminophen) for pain.  In addition to your home medications, take the following post-operative medications as prescribed by your physician:  Apply antibiotic ointment (maxitrol ointment) to all sutures (at the lateral corner of the eye) and into the eye (at the nasal corner of the eye) three times a day  Take scheduled extra strength Tylenol for pain.  You may take 1 to 2 pain pills (norco  or oxycodone as prescribed) as needed for breakthrough pain up to every 6 hours.  The pain pills may make you drowsy. You must not drive a car, operate heavy machinery or drink alcohol while taking them.  The pain pills may cause constipation and nausea. Take them with some food to prevent a stomach upset. If you continue to experience nausea, call your physician.     WARNING: All the prescription pain medications listed above contain Tylenol (acetaminophen). You must not take more than 4,000 mg of acetaminophen per 24-hour period. This is equivalent to 6 tablets of Darvocet, 8 tablets of Vicodin, or 12 tablets of Norco, Percocet or Tylenol #3. If you take other over-the-counter medications containing acetaminophen, you must take the amount of acetaminophen into account and reduce the number of prescribed pain pills accordingly.     Contact information and follow-up:  Return to the Eye Clinic for a follow-up appointment with your physician as scheduled. If no appointment has been scheduled, call [phone] for an appointment with Dr. Schwartz within 1 to 2 weeks from your date of surgery.     For severe pain, bleeding, or loss of vision, call the Eye Clinic at [phone].  After hours or on weekends and holidays, call [phone] and ask to speak with the ophthalmologist on call.     Please set up these additional appointments and get the following tests competed with:  - Primary care provider to check your CBC within 1 week.     It was a pleasure meeting with you today. Thank you for allowing me and my team the privilege of caring for you today. You are the reason we are here, and I truly hope we provided you with the excellent service you deserve. Please let us know if there is anything else we can do for you so that we can be sure you are leaving completely satisfied with your care experience.      Take care!  QASIM Lara, CNP   Hospitalist Service        Unresulted Labs Ordered in the Past 30 Days  of this Admission       Date and Time Order Name Status Description    6/3/2025  8:25 AM RBC and Platelet Morphology In process     6/3/2025  8:18 AM WBC and Differential In process     6/2/2025  5:40 PM CHROMOSOME ANALYSIS, MALIGNANT TISSUE With Professional Interpretation In process     6/2/2025  5:39 PM Flow Cytometry In process     6/2/2025  5:21 PM Surgical Pathology Exam In process         These results will be followed up by Medicine pool and Opthalmology.     Discharge Disposition   Discharged to home  Condition at discharge: Stable    Hospital Course   Umair Castellanos is a 69 year old male with PMH signifciant for diabetes mellitus, type 2, HLD, HTN, psoriasis, bilateral primary osteoarthritis of knee, ocular hypertension and right eye mass who was admitted on 6/2/2025 following planned orbitotomy with biopsy of right orbital mass to be monitored overnight for pain control.      Right orbital mass s/p orbitotomy with biopsy 06/02/2025  Ocular hypertension   Follows with Ophthalmology in clinic. Admitted for planned orbitotomy with biopsy of orbital mass. Underwent without complications.   Ophthalmology recommendations as above,  -Pain: Acetaminophen, oxycodone   -Follow up pathology, flow cytometry, chromoscome analysis   -Per discussion with Ophthalmology, hold off on ASA x 48 hours from procedure (restart on 6/8/25)     Nausea, vomiting, resolved  Weight loss, unintentional, resolved  Patient notes unintentional weight loss over the past month where he reports losing 20lbs in the setting of ongoing nausea, vomiting and inability to keep food down whenever he tried to take in a bite. No diarrhea, abdominal pain, dysphagia. No recent travel, new meds. Denies any heartburn types sxs. No prior similar presentation. Per review of chart, last weight taken at 01/2025 with weight of 301lb, patient is 280lb here. Possible weight loss in the setting of above with work up for possibly lymphoma vs underlying  gastritis vs other. Patient also has a hx of diabetes, which has been controlled based on most recent A1c, but could also consider gastroparesis. Stating he thinks it was from the pain from his eye  -Stop pantoprazole daily   - PRN Zofran at discharge    HTN   - Continue PTA amlodipine 5mg daily and metoprolol XL 100mg daily     Diabetes mellitus type 2  A1c 5.5 in 01/2025. PTA metformin and semaglutide 0.25mg weekly (last received Thursday 2 weeks ago, has not been taking in the setting of nausea and vomiting).   -Continue PTA meformin 1000mg BID, PTA semaglutide    HLD:   - Continue PTA rosuvastatin 10mg daily     Psoriasis:   - PTA methotrexate 2.5mg every Sunday and folic acid; has been referred to Dermatology     Leukocytosis  Lymphocytic predominant. Of note, no CBC prior to surgery and was on steriods prior. No s/s of infection.  - Monitor OP in 1 week with PCP     Elevated liver chemistries, chronic  Bilirubin total 2.4, ALT/AST/alk phos normal. No abdominal pain.   - Monitor OP in 1 week with PCP     Consultations This Hospital Stay   None    Code Status   Full Code    Time Spent on this Encounter   IFlaca APRN CNP, personally saw the patient today and spent greater than 30 minutes discharging this patient.       QASIM Lara CNP  [facility] [address] OBSERV[facility]  [address] [place] [address]  Phone: [phone]  Fax: [phone]  ______________________________________________________________________    Physical Exam   Vital Signs: Temp: 97.4  F (36.3  C) Temp src: Oral BP: 106/72 Pulse: 76   Resp: 16 SpO2: 95 % O2 Device: None (Room air)    Weight: 280 lbs 13.86 oz    General Appearance: In NAD, sitting in bed  HEENT: Eye bandaged with no drainage on right eye  Respiratory: LS clear b/l, normal RR  Cardiovascular: S1, S2, no m/r/g  GI: BS+, all 4 quadrants, no masses, non-tender upon palpation  Skin: Intact on face, arms, legs. No wounds, bruising, or  lesions noted.  Neuropsych:A&Ox4, moving all extremities    Primary Care Physician   Physician No Ref-Primary    Discharge Orders   No discharge procedures on file.    Significant Results and Procedures   Most Recent 3 CBC's:  Recent Labs   Lab Test 06/03/25 0319   WBC 15.0*  15.1*   HGB 15.5   MCV 94  93   PLT 87*     Most Recent 3 BMP's:  Recent Labs   Lab Test 06/03/25 0319 06/26/17  1409     136 138   POTASSIUM 4.4  4.4 4.4   CHLORIDE 100  100 104   CO2 22  22 30   BUN 27.8*  27.8* 20   CR 1.05  1.05 0.95   ANIONGAP 14  14 4   OLVIN 9.7  9.7 9.7   *  153* 120*     Most Recent 2 LFT's:  Recent Labs   Lab Test 06/03/25 0319   AST 37   ALT 33   ALKPHOS 58   BILITOTAL 2.4*   ,   Results for orders placed or performed in visit on 04/10/13   NM MPI Multi Rest Stress    Narrative       Examination:   NM STRESS LEXISCAN and SPECT CT attenuation correction.  Code:   NMPI  Date:  4/12/2013 3:57 PM     Indication:  Chest pain, unspecified.        Additional Information:     .     Protocol:  Rest and stress myocardial perfusion imaging was performed using 9.7  and 40.2 mCi of Tc-99m tetrofosmin. Pharmacological stress was  performed with 0.4 mg of Lexiscan.     Findings:  1. Overall quality of the study: Excellent.  2. Left ventricular cavity is normal     on the rest and stress  studies. There is normal wall motion. There are no changes of t.i.d.  3. SPECT images demonstrate normal perfusion on stress and rest  images without evidence of ischemia or scar. . These results suggest  a low post-scan likelihood angiographically significant coronary  artery disease    . The summed stress score is 2.  4. Left ventricular ejection fraction is 54%. Left ventricular  end-diastolic volume is 136 mL. End-systolic volume is 62 mL.  5. Baseline EKG and Stress findings reported separately in CVIS.          Impression    Impression:  1.    Normal myocardial SPECT study with a summed stress score of 2.  2. No  evidence of ischemia or scar.  3. Normal wall motion.     GISELA SEPULVEDA MD       Discharge Medications   Current Discharge Medication List        START taking these medications    Details   neomycin-polymyxin-dexAMETHasone (MAXITROL) 3.5-85532-1.1 ophthalmic ointment Place 0.1429 Applications (0.5 g) into the right eye 4 times daily.  Qty: 3.5 g, Refills: 0    Associated Diagnoses: Vision loss           CONTINUE these medications which have NOT CHANGED    Details   amLODIPine (NORVASC) 5 MG tablet Take 1 tablet (5 mg) by mouth daily (Needs follow-up appointment for this medication)  Qty: 30 tablet, Refills: 0    Associated Diagnoses: Benign essential hypertension      aspirin 81 MG tablet Take 1 tablet by mouth daily.      metFORMIN (GLUCOPHAGE) 500 MG tablet Take 1,000 mg by mouth.      methotrexate 2.5 MG tablet Take SIX 2.5mg tablets once weekly      metoprolol tartrate (LOPRESSOR) 50 MG tablet Take 1 tablet (50 mg) by mouth 2 times daily (Needs follow-up appointment for this medication)  Qty: 60 tablet, Refills: 0    Associated Diagnoses: Benign essential hypertension      rosuvastatin (CRESTOR) 10 MG tablet Take 10 mg by mouth daily.      semaglutide (OZEMPIC, 0.25 OR 0.5 MG/DOSE,) 2 MG/3ML pen Inject 0.25 mg subcutaneously.      tadalafil (CIALIS) 10 MG tablet       tamsulosin (FLOMAX) 0.4 MG capsule Take 1 capsule (0.4 mg) by mouth daily  Qty: 90 capsule, Refills: 3    Associated Diagnoses: Benign non-nodular prostatic hyperplasia with lower urinary tract symptoms           Allergies   No Known Allergies

## 2025-06-03 NOTE — TELEPHONE ENCOUNTER
Patient's spouse confirmed scheduled appointment:  Date: 6/9/25  Time: 11:00am  Visit type: POST-OP  Provider:   Location: [facility] Location   Testing/imaging: NONE  Additional notes: POST-OP in 1 week. Right orbitotomy with biopsy. Right temporary lateral tarsorrhaphy. DOS 6/2/25 -Appt Per PT's Spouse        Patient will see appointment in discharge paperwork.

## 2025-06-03 NOTE — INTERIM SUMMARY
Brief ophthalmology note:    Patient assessed at bedside POD1 by plastics team. Pain and bleeding well controlled and nausea resolved - findings consistent with that expected one day after orbitotomy.     PLAN:  - Follow-up post-operative instructions as outlined in progress note from yesterday on 6/2/2025  - Patient has scheduled follow-up with Dr. Schwartz next week  - Continue ointment/drops as prescribed until follow-up   - Reach out with any question, okay to discharge from ophthalmology perspective    Surya Conteh MD on 6/3/2025 at 1:04 PM

## 2025-06-03 NOTE — PROGRESS NOTES
Discharge instructions reviewed.  Patient verbalized understanding. PIV removed, patient will be wheeled to the main lobby by transport when ready. Wife is in the room and will be accompanying the patient to the main lobby.  Patient will be discharged when ready.

## 2025-06-03 NOTE — PROGRESS NOTES
Provider noted of continued low HR of 38. New probe put on pt finger. Pulse still low. Awaiting provider recommendations.

## 2025-06-03 NOTE — PLAN OF CARE
Goal Outcome Evaluation:  -diagnostic tests and consults completed and resulted: Met  -vital signs normal or at patient baseline: Met   -tolerating oral intake to maintain hydration: Met   -adequate pain control on oral analgesics: Met   -returns to baseline functional status: Met   -safe disposition plan has been identified: Met

## 2025-06-06 LAB
PATH REPORT.COMMENTS IMP SPEC: ABNORMAL
PATH REPORT.COMMENTS IMP SPEC: YES
PATH REPORT.FINAL DX SPEC: ABNORMAL
PATH REPORT.GROSS SPEC: ABNORMAL
PATH REPORT.MICROSCOPIC SPEC OTHER STN: ABNORMAL
PATH REPORT.RELEVANT HX SPEC: ABNORMAL
PHOTO IMAGE: ABNORMAL

## 2025-06-11 LAB — INTERPRETATION: NORMAL

## 2025-06-18 LAB — CULTURE HARVEST COMPLETE DATE: NORMAL

## 2025-06-19 LAB
INTERPRETATION: NORMAL
INTERPRETATION: NORMAL

## (undated) DEVICE — SU PLAIN 6-0 G-1DA 18" 770G

## (undated) DEVICE — SYR 03ML LL W/O NDL 309657

## (undated) DEVICE — SU PROLENE 5-0 RB-2DA 18" 8713H

## (undated) DEVICE — DRSG TELFA 3X8" 1238

## (undated) DEVICE — GLOVE PROTEXIS MICRO 7.5 LT BLUE 2D73PM75

## (undated) DEVICE — SU PROLENE 5-0 P-3 18" 8698G

## (undated) DEVICE — EYE PREP BETADINE 5% SOLUTION 30ML 0065-0411-30

## (undated) DEVICE — BONE WAX 2.5GM W31G

## (undated) DEVICE — ESU PENCIL SMOKE EVAC W/ROCKER SWITCH 0703-047-000

## (undated) DEVICE — BLADE KNIFE SURG 15 371115

## (undated) DEVICE — SPONGE COTTONOID 1/2X3" 80-1407

## (undated) DEVICE — PACK MINOR EYE

## (undated) DEVICE — TUBING SUCTION 10'X3/16" N510

## (undated) DEVICE — ESU CORD BIPOLAR GREEN 10-4000

## (undated) DEVICE — ESU NDL COLORADO MICRO E1651

## (undated) DEVICE — LINEN TOWEL PACK X5 5464

## (undated) DEVICE — SU PROLENE 5-0 RB-2DA 30" 8710H

## (undated) DEVICE — SU VICRYL 5-0 P-2 18" UND J503G

## (undated) DEVICE — LABEL MEDICATION SYSTEM 3303-P

## (undated) RX ORDER — TETRACAINE HYDROCHLORIDE 5 MG/ML
SOLUTION OPHTHALMIC
Status: DISPENSED
Start: 2025-06-02

## (undated) RX ORDER — BALANCED SALT SOLUTION 6.4; .75; .48; .3; 3.9; 1.7 MG/ML; MG/ML; MG/ML; MG/ML; MG/ML; MG/ML
SOLUTION OPHTHALMIC
Status: DISPENSED
Start: 2025-06-02

## (undated) RX ORDER — FENTANYL CITRATE-0.9 % NACL/PF 10 MCG/ML
PLASTIC BAG, INJECTION (ML) INTRAVENOUS
Status: DISPENSED
Start: 2025-06-02

## (undated) RX ORDER — FENTANYL CITRATE 50 UG/ML
INJECTION, SOLUTION INTRAMUSCULAR; INTRAVENOUS
Status: DISPENSED
Start: 2025-06-02

## (undated) RX ORDER — TRIAMCINOLONE ACETONIDE 40 MG/ML
INJECTION, SUSPENSION INTRA-ARTICULAR; INTRAMUSCULAR
Status: DISPENSED
Start: 2025-06-02

## (undated) RX ORDER — ACETAMINOPHEN 325 MG/1
TABLET ORAL
Status: DISPENSED
Start: 2025-06-02

## (undated) RX ORDER — DEXAMETHASONE SODIUM PHOSPHATE 4 MG/ML
INJECTION, SOLUTION INTRA-ARTICULAR; INTRALESIONAL; INTRAMUSCULAR; INTRAVENOUS; SOFT TISSUE
Status: DISPENSED
Start: 2025-06-02

## (undated) RX ORDER — PROPOFOL 10 MG/ML
INJECTION, EMULSION INTRAVENOUS
Status: DISPENSED
Start: 2025-06-02

## (undated) RX ORDER — ERYTHROMYCIN 5 MG/G
OINTMENT OPHTHALMIC
Status: DISPENSED
Start: 2025-06-02

## (undated) RX ORDER — HYDROMORPHONE HCL IN WATER/PF 6 MG/30 ML
PATIENT CONTROLLED ANALGESIA SYRINGE INTRAVENOUS
Status: DISPENSED
Start: 2025-06-02

## (undated) RX ORDER — CEFAZOLIN SODIUM/WATER 3 G/30 ML
SYRINGE (ML) INTRAVENOUS
Status: DISPENSED
Start: 2025-06-02